# Patient Record
Sex: FEMALE | Race: WHITE | HISPANIC OR LATINO | Employment: UNEMPLOYED | URBAN - METROPOLITAN AREA
[De-identification: names, ages, dates, MRNs, and addresses within clinical notes are randomized per-mention and may not be internally consistent; named-entity substitution may affect disease eponyms.]

---

## 2017-08-23 ENCOUNTER — APPOINTMENT (EMERGENCY)
Dept: RADIOLOGY | Facility: HOSPITAL | Age: 27
End: 2017-08-23
Payer: MEDICARE

## 2017-08-23 ENCOUNTER — HOSPITAL ENCOUNTER (EMERGENCY)
Facility: HOSPITAL | Age: 27
Discharge: HOME | End: 2017-08-23
Attending: EMERGENCY MEDICINE | Admitting: EMERGENCY MEDICINE
Payer: MEDICARE

## 2017-08-23 VITALS
TEMPERATURE: 98.3 F | DIASTOLIC BLOOD PRESSURE: 52 MMHG | OXYGEN SATURATION: 100 % | RESPIRATION RATE: 16 BRPM | HEART RATE: 56 BPM | SYSTOLIC BLOOD PRESSURE: 98 MMHG | WEIGHT: 165 LBS

## 2017-08-23 DIAGNOSIS — N20.0 NEPHROLITHIASIS: Primary | ICD-10-CM

## 2017-08-23 LAB
BACTERIA UR QL AUTO: ABNORMAL /HPF
BILIRUB UR QL STRIP: NEGATIVE
CLARITY UR: ABNORMAL
CLARITY, POC: NORMAL
COLOR UR: YELLOW
COLOR, POC: YELLOW
GLUCOSE UR STRIP-MCNC: NEGATIVE MG/DL
HCG UR QL: NEGATIVE
HGB UR QL STRIP.AUTO: ABNORMAL
HYALINE CASTS #/AREA URNS LPF: ABNORMAL /LPF
KETONES UR STRIP-MCNC: ABNORMAL MG/DL
LEUKOCYTE ESTERASE UR QL STRIP: ABNORMAL
NITRITE UR QL STRIP: NEGATIVE
NON-SQ EPI CELLS URNS QL MICRO: ABNORMAL /HPF
PH UR STRIP.AUTO: 7 [PH] (ref 4.5–8)
PROT UR STRIP-MCNC: ABNORMAL MG/DL
RBC #/AREA URNS AUTO: ABNORMAL /HPF
SP GR UR STRIP.AUTO: 1.02 (ref 1–1.03)
UROBILINOGEN UR QL STRIP.AUTO: 1 E.U./DL
WBC #/AREA URNS AUTO: ABNORMAL /HPF

## 2017-08-23 PROCEDURE — 81001 URINALYSIS AUTO W/SCOPE: CPT

## 2017-08-23 PROCEDURE — 74176 CT ABD & PELVIS W/O CONTRAST: CPT

## 2017-08-23 PROCEDURE — 96375 TX/PRO/DX INJ NEW DRUG ADDON: CPT

## 2017-08-23 PROCEDURE — 81025 URINE PREGNANCY TEST: CPT | Performed by: EMERGENCY MEDICINE

## 2017-08-23 PROCEDURE — 81002 URINALYSIS NONAUTO W/O SCOPE: CPT | Performed by: EMERGENCY MEDICINE

## 2017-08-23 PROCEDURE — 99284 EMERGENCY DEPT VISIT MOD MDM: CPT

## 2017-08-23 PROCEDURE — 96374 THER/PROPH/DIAG INJ IV PUSH: CPT

## 2017-08-23 RX ORDER — OXYCODONE HYDROCHLORIDE AND ACETAMINOPHEN 5; 325 MG/1; MG/1
1 TABLET ORAL EVERY 4 HOURS PRN
Qty: 20 TABLET | Refills: 0 | Status: SHIPPED | OUTPATIENT
Start: 2017-08-23 | End: 2017-08-29

## 2017-08-23 RX ORDER — IBUPROFEN 400 MG/1
400 TABLET ORAL EVERY 6 HOURS PRN
Qty: 30 TABLET | Refills: 0 | Status: SHIPPED | OUTPATIENT
Start: 2017-08-23 | End: 2017-08-29

## 2017-08-23 RX ORDER — KETOROLAC TROMETHAMINE 30 MG/ML
15 INJECTION, SOLUTION INTRAMUSCULAR; INTRAVENOUS ONCE
Status: COMPLETED | OUTPATIENT
Start: 2017-08-23 | End: 2017-08-23

## 2017-08-23 RX ADMIN — HYDROMORPHONE HYDROCHLORIDE 1 MG: 1 INJECTION, SOLUTION INTRAMUSCULAR; INTRAVENOUS; SUBCUTANEOUS at 12:10

## 2017-08-23 RX ADMIN — KETOROLAC TROMETHAMINE 15 MG: 30 INJECTION, SOLUTION INTRAMUSCULAR at 12:12

## 2017-08-29 ENCOUNTER — APPOINTMENT (EMERGENCY)
Dept: RADIOLOGY | Facility: HOSPITAL | Age: 27
End: 2017-08-29
Payer: MEDICARE

## 2017-08-29 ENCOUNTER — HOSPITAL ENCOUNTER (EMERGENCY)
Facility: HOSPITAL | Age: 27
Discharge: HOME | End: 2017-08-29
Attending: EMERGENCY MEDICINE | Admitting: EMERGENCY MEDICINE
Payer: MEDICARE

## 2017-08-29 VITALS
TEMPERATURE: 98.4 F | WEIGHT: 165 LBS | BODY MASS INDEX: 30.36 KG/M2 | OXYGEN SATURATION: 97 % | SYSTOLIC BLOOD PRESSURE: 117 MMHG | DIASTOLIC BLOOD PRESSURE: 58 MMHG | HEART RATE: 83 BPM | RESPIRATION RATE: 16 BRPM | HEIGHT: 62 IN

## 2017-08-29 DIAGNOSIS — N20.0 RIGHT NEPHROLITHIASIS: Primary | ICD-10-CM

## 2017-08-29 LAB
ANION GAP BLD CALC-SCNC: 18 MMOL/L (ref 4–13)
BACTERIA UR QL AUTO: ABNORMAL /HPF
BILIRUB UR QL STRIP: NEGATIVE
BUN BLD-MCNC: 9 MG/DL (ref 5–25)
CA-I BLD-SCNC: 1.2 MMOL/L (ref 1.12–1.32)
CHLORIDE BLD-SCNC: 102 MMOL/L (ref 100–108)
CLARITY UR: CLEAR
COLOR UR: YELLOW
COLOR, POC: YELLOW
CREAT BLD-MCNC: 0.8 MG/DL (ref 0.6–1.3)
GFR SERPL CREATININE-BSD FRML MDRD: 101 ML/MIN/1.73SQ M
GLUCOSE SERPL-MCNC: 110 MG/DL (ref 65–140)
GLUCOSE UR STRIP-MCNC: NEGATIVE MG/DL
HCT VFR BLD CALC: 38 % (ref 34.8–46.1)
HGB BLDA-MCNC: 12.9 G/DL (ref 11.5–15.4)
HGB UR QL STRIP.AUTO: ABNORMAL
HYALINE CASTS #/AREA URNS LPF: ABNORMAL /LPF
KETONES UR STRIP-MCNC: NEGATIVE MG/DL
LEUKOCYTE ESTERASE UR QL STRIP: ABNORMAL
NITRITE UR QL STRIP: NEGATIVE
NON-SQ EPI CELLS URNS QL MICRO: ABNORMAL /HPF
PCO2 BLD: 29 MMOL/L (ref 21–32)
PH UR STRIP.AUTO: 7 [PH] (ref 4.5–8)
POTASSIUM BLD-SCNC: 3.8 MMOL/L (ref 3.5–5.3)
PROT UR STRIP-MCNC: NEGATIVE MG/DL
RBC #/AREA URNS AUTO: ABNORMAL /HPF
SODIUM BLD-SCNC: 143 MMOL/L (ref 136–145)
SP GR UR STRIP.AUTO: 1.01 (ref 1–1.03)
SPECIMEN SOURCE: ABNORMAL
UROBILINOGEN UR QL STRIP.AUTO: 1 E.U./DL
WBC #/AREA URNS AUTO: ABNORMAL /HPF

## 2017-08-29 PROCEDURE — 81025 URINE PREGNANCY TEST: CPT | Performed by: EMERGENCY MEDICINE

## 2017-08-29 PROCEDURE — 80047 BASIC METABLC PNL IONIZED CA: CPT

## 2017-08-29 PROCEDURE — 96375 TX/PRO/DX INJ NEW DRUG ADDON: CPT

## 2017-08-29 PROCEDURE — 96374 THER/PROPH/DIAG INJ IV PUSH: CPT

## 2017-08-29 PROCEDURE — 99284 EMERGENCY DEPT VISIT MOD MDM: CPT

## 2017-08-29 PROCEDURE — 81002 URINALYSIS NONAUTO W/O SCOPE: CPT | Performed by: EMERGENCY MEDICINE

## 2017-08-29 PROCEDURE — 85014 HEMATOCRIT: CPT

## 2017-08-29 PROCEDURE — 81001 URINALYSIS AUTO W/SCOPE: CPT

## 2017-08-29 PROCEDURE — 74176 CT ABD & PELVIS W/O CONTRAST: CPT

## 2017-08-29 RX ORDER — NAPROXEN 500 MG/1
500 TABLET ORAL 2 TIMES DAILY WITH MEALS
Qty: 30 TABLET | Refills: 0 | Status: SHIPPED | OUTPATIENT
Start: 2017-08-29 | End: 2018-02-07

## 2017-08-29 RX ORDER — TAMSULOSIN HYDROCHLORIDE 0.4 MG/1
0.4 CAPSULE ORAL
Qty: 7 CAPSULE | Refills: 0 | Status: SHIPPED | OUTPATIENT
Start: 2017-08-29 | End: 2018-02-07

## 2017-08-29 RX ORDER — KETOROLAC TROMETHAMINE 30 MG/ML
15 INJECTION, SOLUTION INTRAMUSCULAR; INTRAVENOUS ONCE
Status: COMPLETED | OUTPATIENT
Start: 2017-08-29 | End: 2017-08-29

## 2017-08-29 RX ORDER — OXYCODONE HYDROCHLORIDE AND ACETAMINOPHEN 5; 325 MG/1; MG/1
1 TABLET ORAL EVERY 4 HOURS PRN
Qty: 12 TABLET | Refills: 0 | Status: SHIPPED | OUTPATIENT
Start: 2017-08-29 | End: 2018-02-07

## 2017-08-29 RX ORDER — ONDANSETRON 2 MG/ML
4 INJECTION INTRAMUSCULAR; INTRAVENOUS ONCE
Status: COMPLETED | OUTPATIENT
Start: 2017-08-29 | End: 2017-08-29

## 2017-08-29 RX ORDER — ONDANSETRON 4 MG/1
4 TABLET, ORALLY DISINTEGRATING ORAL EVERY 8 HOURS PRN
Qty: 20 TABLET | Refills: 0 | Status: SHIPPED | OUTPATIENT
Start: 2017-08-29 | End: 2018-02-07

## 2017-08-29 RX ADMIN — HYDROMORPHONE HYDROCHLORIDE 0.5 MG: 1 INJECTION, SOLUTION INTRAMUSCULAR; INTRAVENOUS; SUBCUTANEOUS at 21:09

## 2017-08-29 RX ADMIN — KETOROLAC TROMETHAMINE 15 MG: 30 INJECTION, SOLUTION INTRAMUSCULAR at 21:09

## 2017-08-29 RX ADMIN — ONDANSETRON 4 MG: 2 INJECTION INTRAMUSCULAR; INTRAVENOUS at 21:09

## 2017-08-29 RX ADMIN — SODIUM CHLORIDE 1000 ML: 0.9 INJECTION, SOLUTION INTRAVENOUS at 21:20

## 2017-09-13 ENCOUNTER — GENERIC CONVERSION - ENCOUNTER (OUTPATIENT)
Dept: OTHER | Facility: OTHER | Age: 27
End: 2017-09-13

## 2018-01-10 ENCOUNTER — ALLSCRIPTS OFFICE VISIT (OUTPATIENT)
Dept: OTHER | Facility: OTHER | Age: 28
End: 2018-01-10

## 2018-01-10 DIAGNOSIS — E04.1 NONTOXIC SINGLE THYROID NODULE: ICD-10-CM

## 2018-01-10 DIAGNOSIS — R73.01 IMPAIRED FASTING GLUCOSE: ICD-10-CM

## 2018-01-10 DIAGNOSIS — E28.2 POLYCYSTIC OVARIAN SYNDROME: ICD-10-CM

## 2018-01-10 DIAGNOSIS — E03.9 HYPOTHYROIDISM: ICD-10-CM

## 2018-01-10 DIAGNOSIS — E04.9 NONTOXIC GOITER: ICD-10-CM

## 2018-01-12 NOTE — PROGRESS NOTES
Assessment   1  Hypothyroidism (244 9) (E03 9)   2  Thyroid nodule (241 0) (E04 1)   3  Enlarged thyroid (240 9) (E04 9)   4  Asthma (493 90) (J45 909)   5  Polycystic ovarian syndrome (256 4) (E28 2)   6  Impaired fasting blood sugar (790 21) (R73 01)    Plan   Asthma    · Proventil  (90 Base) MCG/ACT Inhalation Aerosol Solution; INHALE 1 TO 2    PUFFS EVERY 4 TO 6 HOURS AS NEEDED  Enlarged thyroid, Thyroid nodule    · US THYROID; Status:Hold For - Scheduling; Requested for:10Jan2018;   Hypothyroidism    · (1) CBC/PLT/DIFF; Status:Active; Requested UOH:[id number];    · (1) COMPREHENSIVE METABOLIC PANEL; Status:Active; Requested QVC:[id number];    · (1) LIPID PANEL, FASTING; Status:Active; Requested INR:[id number];    · (1) TSH; Status:Active; Requested TTC:[id number]; Impaired fasting blood sugar, Polycystic ovarian syndrome    · (1) HEMOGLOBIN A1C; Status:Active; Requested CATALINA:[id number]; Discussion/Summary      29 yo female hypothyroid/thyroid nodules- see above A/P- will restart levothyroxine after lab results reviewed  Asthma- by history mild, intermittent  Renewed rescue inhaler  Follow up in next 4-6 weeks-  future needs- spirometry and ACT, depression screen, GYN/PAP flu vaccine  The patient was counseled regarding instructions for management,-- impressions,-- importance of compliance with treatment  total time of encounter was 40 minutes-- and-- 20 minutes was spent counseling  Possible side effects of new medications were reviewed with the patient/guardian today  The treatment plan was reviewed with the patient/guardian  The patient/guardian understands and agrees with the treatment plan      Chief Complaint   thyroid check    Patient is here today for follow up of chronic conditions described in HPI  History of Present Illness   29 yo female here to re-establish care  Relocated to [place], moved back last year and hasnât re-established care   She's been off all medications for about 1 year  fatigued, chronically  +hypothyroid and thyroid nodules- didn't follow up with US thyroid, off medication for about 1 year  PCOS and having painful periods and pelvic pain intermittently  Was metformin in past  LMP- 1/1/2018, regular  Last PAP uncertain  using rescue inhaler rarely, only with URI symptoms  Hasn't had to use in past 4 weeks at all  Review of Systems        Constitutional: no fever,-- not feeling poorly-- and-- not feeling tired  Eyes: no eyesight problems  ENT: no hearing loss  Cardiovascular: no chest pain,-- no intermittent leg claudication,-- no palpitations-- and-- no lower extremity edema  Respiratory: no shortness of breath,-- no cough-- and-- no wheezing  Gastrointestinal: no abdominal pain,-- no nausea-- and-- no diarrhea  Genitourinary: as noted in HPI  Neurological: no headache,-- no numbness,-- no tingling-- and-- no fainting  Psychiatric: not suicidal,-- no anxiety-- and-- no depression  Endocrine: no muscle weakness  Hematologic/Lymphatic: no tendency for easy bleeding-- and-- no tendency for easy bruising  Active Problems   1  Asthma (493 90) (J45 909)   2  Depression (311) (F32 9)   3  Enlarged thyroid (240 9) (E04 9)   4  Hypothyroidism (244 9) (E03 9)   5  Polycystic ovarian syndrome (256 4) (E28 2)   6  Primary female infertility (628 9) (N97 9)   7  Thyroid nodule (241 0) (E04 1)   8  Tinea corporis (110 5) (B35 4)    Past Medical History      The active problems and past medical history were reviewed and updated today  Family History   Mother    1  No pertinent family history  Family History    2  Family history of Diabetes Mellitus (V18 0)   3  Family history of Hypertension (V17 49)     The family history was reviewed and updated today         Social History    · Being A Social Drinker   · Denied: History of Drug use   · Former smoker (V15 82) (I94 250)   · History of Using Marijuana  The social history was reviewed and updated today  Current Meds      The medication list was reviewed and updated today  Allergies   1  No Known Drug Allergies  2  No Known Environmental Allergies   3  No Known Food Allergies    Vitals   Vital Signs    Recorded: [id number] 11:50AM   Temperature 97 5 F   Heart Rate 78   Respiration 16   Systolic 760   Diastolic 80   Height 5 ft 2 in   Weight 147 lb 2 oz   BMI Calculated 26 91   BSA Calculated 1 68     Physical Exam        Constitutional      General appearance: No acute distress, well appearing and well nourished  Eyes      Conjunctiva and lids: No swelling, erythema or discharge  Pupils and irises: Equal, round and reactive to light  Ears, Nose, Mouth, and Throat      External inspection of ears and nose: Normal        Otoscopic examination: Tympanic membranes translucent with normal light reflex  Canals patent without erythema  Nasal mucosa, septum, and turbinates: Normal without edema or erythema  Oropharynx: Normal with no erythema, edema, exudate or lesions  Pulmonary      Respiratory effort: No increased work of breathing or signs of respiratory distress  Auscultation of lungs: Clear to auscultation  Cardiovascular      Auscultation of heart: Normal rate and rhythm, normal S1 and S2, without murmurs  Examination of extremities for edema and/or varicosities: Normal        Abdomen      Abdomen: Non-tender, no masses  Lymphatic      Palpation of lymph nodes in neck: No lymphadenopathy  Musculoskeletal      Gait and station: Normal        Digits and nails: Normal without clubbing or cyanosis  Skin      Skin and subcutaneous tissue: Normal without rashes or lesions  Neurologic      Reflexes: 2+ and symmetric         Psychiatric      Orientation to person, place, and time: Normal        Mood and affect: Normal        Additional Exam:  thyroid, full/mildly enlarged, ?nodule left lobe  Attending Note   Collaborating Note: I agree with the Advanced Practitioner note        Signatures    Electronically signed by : Sonja Arango, [address]; Francisco 10 2018  1:17PM EST                       (Author)     Electronically signed by : AUREA Bennett ; Francisco 10 2018  1:58PM EST                       (Author)

## 2018-01-16 ENCOUNTER — APPOINTMENT (OUTPATIENT)
Dept: LAB | Facility: HOSPITAL | Age: 28
End: 2018-01-16
Payer: MEDICARE

## 2018-01-16 ENCOUNTER — HOSPITAL ENCOUNTER (OUTPATIENT)
Dept: RADIOLOGY | Facility: HOSPITAL | Age: 28
Discharge: HOME | End: 2018-01-16
Payer: MEDICARE

## 2018-01-16 ENCOUNTER — TRANSCRIBE ORDERS (OUTPATIENT)
Dept: LAB | Facility: HOSPITAL | Age: 28
End: 2018-01-16

## 2018-01-16 ENCOUNTER — GENERIC CONVERSION - ENCOUNTER (OUTPATIENT)
Dept: OTHER | Facility: OTHER | Age: 28
End: 2018-01-16

## 2018-01-16 DIAGNOSIS — E28.2 POLYCYSTIC OVARIAN SYNDROME: ICD-10-CM

## 2018-01-16 DIAGNOSIS — E04.1 NONTOXIC SINGLE THYROID NODULE: ICD-10-CM

## 2018-01-16 DIAGNOSIS — R73.01 IMPAIRED FASTING GLUCOSE: ICD-10-CM

## 2018-01-16 DIAGNOSIS — E03.9 HYPOTHYROIDISM: ICD-10-CM

## 2018-01-16 DIAGNOSIS — E04.9 NONTOXIC GOITER: ICD-10-CM

## 2018-01-16 LAB
ALBUMIN SERPL BCP-MCNC: 4.1 G/DL (ref 3.5–5)
ALP SERPL-CCNC: 59 U/L (ref 46–116)
ALT SERPL W P-5'-P-CCNC: 21 U/L (ref 12–78)
ANION GAP SERPL CALCULATED.3IONS-SCNC: 6 MMOL/L (ref 4–13)
AST SERPL W P-5'-P-CCNC: 17 U/L (ref 5–45)
BASOPHILS # BLD AUTO: 0.01 THOUSANDS/ΜL (ref 0–0.1)
BASOPHILS NFR BLD AUTO: 0 % (ref 0–1)
BILIRUB SERPL-MCNC: 0.61 MG/DL (ref 0.2–1)
BUN SERPL-MCNC: 12 MG/DL (ref 5–25)
CALCIUM SERPL-MCNC: 9.4 MG/DL (ref 8.3–10.1)
CHLORIDE SERPL-SCNC: 103 MMOL/L (ref 100–108)
CHOLEST SERPL-MCNC: 173 MG/DL (ref 50–200)
CO2 SERPL-SCNC: 30 MMOL/L (ref 21–32)
CREAT SERPL-MCNC: 0.68 MG/DL (ref 0.6–1.3)
EOSINOPHIL # BLD AUTO: 0.14 THOUSAND/ΜL (ref 0–0.61)
EOSINOPHIL NFR BLD AUTO: 3 % (ref 0–6)
ERYTHROCYTE [DISTWIDTH] IN BLOOD BY AUTOMATED COUNT: 12.1 % (ref 11.6–15.1)
EST. AVERAGE GLUCOSE BLD GHB EST-MCNC: 108 MG/DL
GFR SERPL CREATININE-BSD FRML MDRD: 119 ML/MIN/1.73SQ M
GLUCOSE P FAST SERPL-MCNC: 94 MG/DL (ref 65–99)
HBA1C MFR BLD: 5.4 % (ref 4.2–6.3)
HCT VFR BLD AUTO: 41.5 % (ref 34.8–46.1)
HDLC SERPL-MCNC: 73 MG/DL (ref 40–60)
HGB BLD-MCNC: 14.3 G/DL (ref 11.5–15.4)
LDLC SERPL CALC-MCNC: 86 MG/DL (ref 0–100)
LYMPHOCYTES # BLD AUTO: 2.01 THOUSANDS/ΜL (ref 0.6–4.47)
LYMPHOCYTES NFR BLD AUTO: 45 % (ref 14–44)
MCH RBC QN AUTO: 32.9 PG (ref 26.8–34.3)
MCHC RBC AUTO-ENTMCNC: 34.5 G/DL (ref 31.4–37.4)
MCV RBC AUTO: 95 FL (ref 82–98)
MONOCYTES # BLD AUTO: 0.3 THOUSAND/ΜL (ref 0.17–1.22)
MONOCYTES NFR BLD AUTO: 7 % (ref 4–12)
NEUTROPHILS # BLD AUTO: 2.04 THOUSANDS/ΜL (ref 1.85–7.62)
NEUTS SEG NFR BLD AUTO: 45 % (ref 43–75)
NRBC BLD AUTO-RTO: 0 /100 WBCS
PLATELET # BLD AUTO: 322 THOUSANDS/UL (ref 149–390)
PMV BLD AUTO: 9.8 FL (ref 8.9–12.7)
POTASSIUM SERPL-SCNC: 4.1 MMOL/L (ref 3.5–5.3)
PROT SERPL-MCNC: 8 G/DL (ref 6.4–8.2)
RBC # BLD AUTO: 4.35 MILLION/UL (ref 3.81–5.12)
SODIUM SERPL-SCNC: 139 MMOL/L (ref 136–145)
TRIGL SERPL-MCNC: 69 MG/DL
TSH SERPL DL<=0.05 MIU/L-ACNC: 8.74 UIU/ML (ref 0.36–3.74)
WBC # BLD AUTO: 4.51 THOUSAND/UL (ref 4.31–10.16)

## 2018-01-16 PROCEDURE — 80061 LIPID PANEL: CPT

## 2018-01-16 PROCEDURE — 76536 US EXAM OF HEAD AND NECK: CPT

## 2018-01-16 PROCEDURE — 84443 ASSAY THYROID STIM HORMONE: CPT

## 2018-01-16 PROCEDURE — 36415 COLL VENOUS BLD VENIPUNCTURE: CPT

## 2018-01-16 PROCEDURE — 85025 COMPLETE CBC W/AUTO DIFF WBC: CPT

## 2018-01-16 PROCEDURE — 80053 COMPREHEN METABOLIC PANEL: CPT

## 2018-01-16 PROCEDURE — 83036 HEMOGLOBIN GLYCOSYLATED A1C: CPT

## 2018-01-23 ENCOUNTER — GENERIC CONVERSION - ENCOUNTER (OUTPATIENT)
Dept: OTHER | Facility: OTHER | Age: 28
End: 2018-01-23

## 2018-01-23 VITALS
RESPIRATION RATE: 16 BRPM | BODY MASS INDEX: 27.08 KG/M2 | HEIGHT: 62 IN | DIASTOLIC BLOOD PRESSURE: 80 MMHG | WEIGHT: 147.13 LBS | HEART RATE: 78 BPM | SYSTOLIC BLOOD PRESSURE: 116 MMHG | TEMPERATURE: 97.5 F

## 2018-01-23 NOTE — RESULT NOTES
Discussion/Summary     labs show hypothyroidism  All other results were in normal range  Plan- levothyroxine ordered  Recheck TSH in 8 weeks  Follow up  in office as planned  Verified Results  (1) TSH [id number] 12:09PM Rea Cortes Order Number: [id number]     Test Name Result Flag Reference   TSH 8 740 uIU/mL H 0 358-3 740   Patients undergoing fluorescein dye angiography may retain small amounts of fluorescein in the body for 48-72 hours post procedure  Samples containing fluorescein can produce falsely depressed TSH values  If the patient had this procedure,a specimen should be resubmitted post fluorescein clearance  The recommended reference ranges for TSH during pregnancy are as follows:  First trimester 0 1 to 2 5 uIU/mL  Second trimester  0 2 to 3 0 uIU/mL  Third trimester 0 3 to 3 0 uIU/m     (1) CBC/PLT/DIFF [id number] 12:09PM Rea Cortes Order Number: [id number]     Test Name Result Flag Reference   WBC COUNT 4 51 Thousand/uL  4 31-10 16   RBC COUNT 4 35 Million/uL  3 81-5 12   HEMOGLOBIN 14 3 g/dL  11 5-15 4   HEMATOCRIT 41 5 %  34 8-46  1   MCV 95 fL  82-98   MCH 32 9 pg  26 8-34 3   MCHC 34 5 g/dL  31 4-37 4   RDW 12 1 %  11 6-15 1   MPV 9 8 fL  8 9-12 7   PLATELET COUNT 253 Thousands/uL  149-390   nRBC AUTOMATED 0 /100 WBCs     NEUTROPHILS RELATIVE PERCENT 45 %  43-75   LYMPHOCYTES RELATIVE PERCENT 45 % H 14-44   MONOCYTES RELATIVE PERCENT 7 %  4-12   EOSINOPHILS RELATIVE PERCENT 3 %  0-6   BASOPHILS RELATIVE PERCENT 0 %  0-1   NEUTROPHILS ABSOLUTE COUNT 2 04 Thousands/? ??L  1 85-7 62   LYMPHOCYTES ABSOLUTE COUNT 2 01 Thousands/? ??L  0 60-4 47   MONOCYTES ABSOLUTE COUNT 0 30 Thousand/? ??L  0 17-1 22   EOSINOPHILS ABSOLUTE COUNT 0 14 Thousand/? ??L  0 00-0 61   BASOPHILS ABSOLUTE COUNT 0 01 Thousands/? ??L  0 00-0 10     (1) COMPREHENSIVE METABOLIC PANEL [id number] 58:52TA Rea Cortes Order Number: [id number]     Test Name Result Flag Reference   SODIUM 139 mmol/L  136-145   POTASSIUM 4 1 mmol/L  3 5-5 3   CHLORIDE 103 mmol/L  100-108   CARBON DIOXIDE 30 mmol/L  21-32   ANION GAP (CALC) 6 mmol/L  4-13   BLOOD UREA NITROGEN 12 mg/dL  5-25   CREATININE 0 68 mg/dL  0 60-1 30   Standardized to IDMS reference method   CALCIUM 9 4 mg/dL  8 3-10 1   BILI, TOTAL 0 61 mg/dL  0 20-1 00   ALK PHOSPHATAS 59 U/L     ALT (SGPT) 21 U/L  12-78   Specimen collection should occur prior to Sulfasalazine and/or Sulfapyridine administration due to the potential for falsely depressed results  AST(SGOT) 17 U/L  5-45   Specimen collection should occur prior to Sulfasalazine administration due to the potential for falsely depressed results  ALBUMIN 4 1 g/dL  3 5-5 0   TOTAL PROTEIN 8 0 g/dL  6 4-8 2   eGFR 119 ml/min/1 73sq m     [facility] Disease Education Program recommendations are as follows:  GFR calculation is accurate only with a steady state creatinine  Chronic Kidney disease less than 60 ml/min/1 73 sq  meters  Kidney failure less than 15 ml/min/1 73 sq  meters  GLUCOSE FASTING 94 mg/dL  65-99   Specimen collection should occur prior to Sulfasalazine administration due to the potential for falsely depressed results  Specimen collection should occur prior to Sulfapyridine administration due to the potential for falsely elevated results  (1) LIPID PANEL, FASTING [id number] 12:09PM Cha Knapp    Order Number: [id number]     Test Name Result Flag Reference   CHOLESTEROL 173 mg/dL     Cholesterol:    Desirable <200 mg/dl    Borderline 200-239 mg/dl    High>239   HDL,DIRECT 73 mg/dL H 40-60   HDL Cholesterol:    High>59 mg/dL    Low <41 mg/dL   LDL CHOLESTEROL CALCULATED 86 mg/dL  0-100   This screening LDL is a calculated result  It does not have the accuracy of the Direct Measured LDL in the monitoring of patients with hyperlipidemia and/or statin therapy     Direct Measure LDL (WSO040) must be ordered separately in these patients  Triglyceride:        Normal <150 mg/dl   Borderline High 150-199 mg/dl   High 200-499 mg/dl   Very High >499 mg/dl   TRIGLYCERIDES 69 mg/dL  <=150   Specimen collection should occur prior to N-Acetylcysteine or Metamizole administration due to the potential for falsely depressed results  (1) HEMOGLOBIN A1C [id number] 12:09PM Leonardo Min Order Number: [id number]     Test Name Result Flag Reference   HEMOGLOBIN A1C 5 4 %  4 2-6 3   EST  AVG  GLUCOSE 108 mg/dl         Plan  Hypothyroidism    · Levothyroxine Sodium 25 MCG Oral Tablet; TAKE 1 TABLET DAILY AS DIRECTED  on an empty stomach   · (1) TSH; Status:Active;  Requested NNT:[id number];     Signatures   Electronically signed by : Freddie Mulligan, [facility]; Jan 16 2018  3:57PM EST                       (Author)

## 2018-01-24 ENCOUNTER — TELEPHONE (OUTPATIENT)
Dept: FAMILY MEDICINE CLINIC | Facility: CLINIC | Age: 28
End: 2018-01-24

## 2018-01-24 NOTE — RESULT NOTES
Discussion/Summary   No discrete noduled identified on thyroid us  Consider futher eval  Schedule office visit for re-eval and exam      Verified Results  [address] [place] [address] [id number] 11:26AM Madeline Ramos Order Number: [id number]    - Patient Instructions: To schedule this appointment, please contact Central Scheduling at [phone]  Test Name Result Flag Reference   US THYROID (Report)     THYROID ULTRASOUND     INDICATION: E04 1: Nontoxic single thyroid nodule   E04 9: Nontoxic goiter, unspecified  History taken directly from the electronic ordering system  COMPARISON: October 20, 2011     TECHNIQUE:  Ultrasound of the thyroid was performed with a high frequency linear transducer in transverse and sagittal planes including volumetric imaging sweeps as well as traditional still imaging technique  FINDINGS: Thyroid parenchyma is diffusely heterogeneous in echotexture  Right lobe: 4 5 x 1 8 x 1 7 cm  Left lobe: 4 7 x 1 7 x 1 7 cm  Isthmus: 0 4 cm  No nodules bilaterally of greater than 1cm  IMPRESSION:   Extremely heterogeneous thyroid gland which can obscure thyroid nodules  No discrete nodules were identified  Reference: ACR Thyroid Imaging, Reporting and Data System (TI-RADS): White Paper of the [facility]  J AM Kurtis Radiol 5147;25:[id number]          Workstation performed: [id number]     Signed by:   Charbel Souza MD   1/16/18

## 2018-02-05 ENCOUNTER — HOSPITAL ENCOUNTER (EMERGENCY)
Facility: HOSPITAL | Age: 28
Discharge: HOME | End: 2018-02-05
Attending: EMERGENCY MEDICINE | Admitting: EMERGENCY MEDICINE
Payer: COMMERCIAL

## 2018-02-05 VITALS
OXYGEN SATURATION: 96 % | WEIGHT: 158 LBS | TEMPERATURE: 97.7 F | HEIGHT: 62 IN | SYSTOLIC BLOOD PRESSURE: 103 MMHG | BODY MASS INDEX: 29.08 KG/M2 | RESPIRATION RATE: 18 BRPM | DIASTOLIC BLOOD PRESSURE: 57 MMHG | HEART RATE: 89 BPM

## 2018-02-05 DIAGNOSIS — F10.10 ALCOHOL ABUSE: ICD-10-CM

## 2018-02-05 DIAGNOSIS — F32.A DEPRESSION: Primary | ICD-10-CM

## 2018-02-05 LAB
AMPHETAMINES SERPL QL SCN: NEGATIVE
BARBITURATES UR QL: NEGATIVE
BENZODIAZ UR QL: NEGATIVE
COCAINE UR QL: NEGATIVE
ETHANOL EXG-MCNC: 0.19 MG/DL
METHADONE UR QL: NEGATIVE
OPIATES UR QL SCN: NEGATIVE
PCP UR QL: NEGATIVE
THC UR QL: NEGATIVE

## 2018-02-05 PROCEDURE — 99284 EMERGENCY DEPT VISIT MOD MDM: CPT

## 2018-02-05 PROCEDURE — 82075 ASSAY OF BREATH ETHANOL: CPT | Performed by: EMERGENCY MEDICINE

## 2018-02-05 PROCEDURE — 90715 TDAP VACCINE 7 YRS/> IM: CPT | Performed by: EMERGENCY MEDICINE

## 2018-02-05 PROCEDURE — 80307 DRUG TEST PRSMV CHEM ANLYZR: CPT | Performed by: EMERGENCY MEDICINE

## 2018-02-05 PROCEDURE — 90471 IMMUNIZATION ADMIN: CPT

## 2018-02-05 RX ORDER — IBUPROFEN 400 MG/1
400 TABLET ORAL ONCE
Status: COMPLETED | OUTPATIENT
Start: 2018-02-05 | End: 2018-02-05

## 2018-02-05 RX ORDER — LIDOCAINE HYDROCHLORIDE AND EPINEPHRINE 10; 10 MG/ML; UG/ML
5 INJECTION, SOLUTION INFILTRATION; PERINEURAL ONCE
Status: COMPLETED | OUTPATIENT
Start: 2018-02-05 | End: 2018-02-05

## 2018-02-05 RX ADMIN — IBUPROFEN 400 MG: 400 TABLET, FILM COATED ORAL at 08:57

## 2018-02-05 RX ADMIN — TETANUS TOXOID, REDUCED DIPHTHERIA TOXOID AND ACELLULAR PERTUSSIS VACCINE, ADSORBED 0.5 ML: 5; 2.5; 8; 8; 2.5 SUSPENSION INTRAMUSCULAR at 05:34

## 2018-02-05 RX ADMIN — LIDOCAINE HYDROCHLORIDE,EPINEPHRINE BITARTRATE 5 ML: 10; .01 INJECTION, SOLUTION INFILTRATION; PERINEURAL at 04:25

## 2018-02-05 NOTE — DISCHARGE INSTRUCTIONS
Please seek immediate medical attention if you again developed thoughts of hurting yourself  Please use the outpatient follow-up provided for you by our crisis worker  Please refrain from drinking alcohol or using drugs  Return to the ER for new or worrisome symptoms  Depression   WHAT YOU NEED TO KNOW:   Depression is a medical condition that causes feelings of sadness or hopelessness that do not go away  Depression may cause you to lose interest in things you used to enjoy  These feelings may interfere with your daily life  DISCHARGE INSTRUCTIONS:   Call 911 for any of the following:   · You think about harming yourself or someone else  Contact your healthcare provider if:   · Your symptoms do not improve  · You cannot make it to your next appointment  · You have new symptoms  · You have questions or concerns about your condition or care  Medicines:   · Antidepressants  may be given to improve or balance your mood  You may need to take this medicine for several weeks before you begin to feel better  · Take your medicine as directed  Contact your healthcare provider if you think your medicine is not helping or if you have side effects  Tell him of her if you are allergic to any medicine  Keep a list of the medicines, vitamins, and herbs you take  Include the amounts, and when and why you take them  Bring the list or the pill bottles to follow-up visits  Carry your medicine list with you in case of an emergency  Therapy  may be used to treat your depression  A therapist will help you learn to cope with your thoughts and feelings  This can be done alone or in a group  It may also be done with family members or a significant other  Self-care:   · Get regular physical activity  Try to exercise for 30 minutes, 3 to 5 days a week  Work with your healthcare provider to develop an exercise plan that you enjoy  Physical activity may improve your symptoms  · Get enough sleep    Create a routine to help you relax before bed  You can listen to music, read, or do yoga  Try to go to bed and wake up at the same time every day  Sleep is important for emotional health  · Eat a variety of healthy foods from all of the food groups  A healthy meal plan is low in fat, salt, and added sugar  Ask your healthcare provider for more information about a meal plan that is right for you  · Do not drink alcohol or use drugs  Alcohol and drugs can make your symptoms worse  Follow up with your healthcare provider as directed: Your healthcare provider will monitor your progress at follow-up visits  He or she will also monitor your medicine if you take antidepressants  Your healthcare provider will ask if the medicine is helping  Tell him or her about any side effects or problems you may have with your medicine  The type or amount of medicine may need to be changed  Write down your questions so you remember to ask them during your visits  © 2017 [employer] Information is for End User's use only and may not be sold, redistributed or otherwise used for commercial purposes  All illustrations and images included in CareNotes® are the copyrighted property of [employer]  or [employer]  The above information is an  only  It is not intended as medical advice for individual conditions or treatments  Talk to your doctor, nurse or pharmacist before following any medical regimen to see if it is safe and effective for you  Abuse of Alcohol   AMBULATORY CARE:   Alcohol abuse   · is unhealthy drinking behavior  You may drink too much at one time once a week, or continue to drink too much daily  You continue to drink even though it causes problems  The problems can be alcohol related legal problems or problems with work or family  · If you drink too much at one time, you are binge drinking  Binge drinking is when you have a large amount of alcohol in a short time   Your blood alcohol concentrations (DANE) goes above 0 08 g/dLlevel during binge drinking  For men, this usually happens with more than 4 drinks in 2 hours  For women, it is more than 3 drinks in 2 hours  A drink is 12 ounces of beer, 4 ounces of wine, or 1½ ounces of liquor  Common signs and symptoms of alcohol abuse include:  Each person that abuses alcohol may have different symptoms  The following are common signs and symptoms of alcohol abuse:  · Loss of interest in activities, work, and school    · Decreased interest in family and friends    · Depression    · Constant thoughts about drinking    · Not able to control the amount you drink    · Restlessness, or erratic and violent behavior  Call 911 for any of the following:   · You have sudden chest pain or trouble breathing  · You have a seizure or have shaking or trembling  · You feel like you could harm yourself or others  · You were in an accident because of alcohol  Seek care immediately if:   · You have hallucinations (you see or hear things that are not real)  · You cannot stop vomiting or you vomit blood  Contact your healthcare provider if:   · You need help to stop drinking alcohol  · You have questions or concerns about your condition or care  Long-term effects of alcohol abuse:   · Blackouts    · Memory loss    · Dementia    · Liver disease    · Thiamine (vitamin B1) deficiency  Treatment for alcohol abuse  may include the following:  · Detoxification (detox) and withdrawal  is a program that helps you to safely get alcohol out of your body  Detox can also help get rid of the physical need to drink  Healthcare providers monitor the physical symptoms of withdrawal  They may give you medicines to help decrease nausea, dehydration, and seizures  Healthcare providers will also monitor your blood pressure, heart and breathing rates, and your temperature   Symptoms of anxiety, depression, and suicidal thoughts are also monitored and managed during detox  Healthcare providers may give you medicines for these symptoms and therapy sessions will be available to you  Detox is usually done at a detox center or in a hospital  Healthcare providers do not recommend that you try to detox at home or by yourself  Withdrawal symptoms may become life threatening  The center can help you find 12 step programs or an individual therapist to help with emotional support after detox  · Inpatient and outpatient treatment  focus on your personal needs to help you stop drinking  Treatment helps you understand the reasons you abuse alcohol  Counselors and therapists provide you with support and help you find ways to cope instead of drinking  You may need inpatient treatment to provide a controlled environment  You may need outpatient treatment after your inpatient treatment is complete  · Alcohol aversion therapy  takes away the desire to drink by causing a negative reaction when you drink  Healthcare providers may give you medicines that cause nausea and vomiting when you drink alcohol  They may instead give you a medicine that decreases your urge to drink alcohol  These medicines are used to help you stop drinking or reduce the amount you drink  They can also help you avoid relapse  Risks of alcohol abuse:  Alcohol abuse increases your risk for gastrointestinal cancers, brain damage and problems with your immune system  It also increases your risk for heart, kidney, and lung damage  The risk of stroke increases with alcohol abuse  If you are pregnant and drink alcohol, you and your baby are at risk for serious health problems  Avoid alcohol:  You should stop drinking entirely  Alcohol can damage your brain, heart, and liver  It also increases your risk for injury, high blood pressure, and certain types of cancer  Alcohol is dangerous when you combine it with certain medicines     Do not drive if you drink alcohol:  Make sure someone who has not been drinking can help you get home  Get support:  Most people need support to stop drinking alcohol  Mental health providers, support groups, rehabilitation centers, and your healthcare provider can provide support  For more information:   · Alcoholics Anonymous  Web Address: http://www [employer]/  · Substance Abuse and [place] [address] , [address]  Web Address: https://[employer]/  Follow up with your healthcare provider as directed:  Write down your questions so you remember to ask them during your visits  © 2017 [employer]  Information is for End User's use only and may not be sold, redistributed or otherwise used for commercial purposes  All illustrations and images included in CareNotes® are the copyrighted property of [employer]  or [employer]  The above information is an  only  It is not intended as medical advice for individual conditions or treatments  Talk to your doctor, nurse or pharmacist before following any medical regimen to see if it is safe and effective for you

## 2018-02-05 NOTE — ED NOTES
Was in talking with pt and now deciding if she should send pt home  pt is now resting  she did have her lunch     Karin Baumgarten  02/05/18 2298

## 2018-02-05 NOTE — ED NOTES
Pt ambulated to bathroom to provide urine specimen with friend and 1:1  Pt returned to room bleeding from laceration on wrist, friend claiming she didn't do anything  Pt urine specimen was very clear  Dr Nirmal Morris at bedside        Brandy Fields  02/05/18 [id number]

## 2018-02-05 NOTE — ED NOTES
2 pt belonging bags placed in zone 5 med room cabinet D        Zoë Jackter  02/05/18 [facility]  02/05/18 5137

## 2018-02-05 NOTE — ED NOTES
Patient states that she does not want to see a crisis worker and would like to leave  Dr Cecy Ralph made aware  Dr Cecy Ralph at bedside to speak with patient       Kyra Loya RN  02/05/18 0705

## 2018-02-05 NOTE — ED PROVIDER NOTES
History  Chief Complaint   Patient presents with    Psychiatric Evaluation     as per ems - patient having trouble at home, cut wrist this evening "because she tired of what she's going through"     79-year-old female presenting to the ER today with a chief complaint of suicide attempt  Patient states she has been depressed because of her aggressive roommate  States "he is doing things he shouldn't be"  Patient states she took a razor blade to her left wrist today  States she was trying to kill herself  Also states she drank several alcoholic beverages tonight  States she wants to hurt herself in the ER  On exam patient did have a 3 cm laceration noted on the palmar aspect of the left wrist   Patient went to the bathroom and attempted to reopen the laceration with her hand  Assessment plan:  79-year-old female with suicidality status post self-inflicted laceration  Will repair laceration, continue observation, psychiatric consult  Prior to Admission Medications   Prescriptions Last Dose Informant Patient Reported? Taking? Thyroid (LEVOTHYROXINE-LIOTHYRONINE PO)   Yes Yes   Sig: Take by mouth   naproxen (NAPROSYN) 500 mg tablet   No No   Sig: Take 1 tablet by mouth 2 (two) times a day with meals for 30 doses   ondansetron (ZOFRAN-ODT) 4 mg disintegrating tablet   No No   Sig: Take 1 tablet by mouth every 8 (eight) hours as needed for nausea or vomiting for up to 20 doses   oxyCODONE-acetaminophen (PERCOCET) 5-325 mg per tablet   No No   Sig: Take 1 tablet by mouth every 4 (four) hours as needed for moderate pain for up to 12 doses Max Daily Amount: 6 tablets   tamsulosin (FLOMAX) 0 4 mg   No No   Sig: Take 1 capsule by mouth daily with dinner for 7 doses      Facility-Administered Medications: None       Past Medical History:   Diagnosis Date    Asthma     Disease of thyroid gland     Kidney stones        History reviewed  No pertinent surgical history  History reviewed   No pertinent family history  I have reviewed and agree with the history as documented  Social History   Substance Use Topics    Smoking status: Never Smoker    Smokeless tobacco: Never Used    Alcohol use Yes        Review of Systems   Constitutional: Negative  Negative for appetite change, chills, diaphoresis, fatigue and fever  HENT: Negative  Eyes: Negative  Respiratory: Negative  Cardiovascular: Negative  Gastrointestinal: Negative for abdominal pain, blood in stool, constipation, diarrhea, nausea and vomiting  Endocrine: Negative  Genitourinary: Negative for decreased urine volume, difficulty urinating, dyspareunia, dysuria, flank pain, frequency, hematuria, pelvic pain, urgency, vaginal bleeding, vaginal discharge and vaginal pain  Musculoskeletal: Negative  Skin: Negative  Allergic/Immunologic: Negative  Neurological: Negative  Psychiatric/Behavioral: Positive for sleep disturbance and suicidal ideas  All other systems reviewed and are negative  Physical Exam  ED Triage Vitals   Temperature Pulse Respirations Blood Pressure SpO2   02/05/18 0413 02/05/18 0413 02/05/18 0413 02/05/18 0413 02/05/18 0413   97 7 °F (36 5 °C) 96 20 119/74 96 %      Temp Source Heart Rate Source Patient Position - Orthostatic VS BP Location FiO2 (%)   02/05/18 0413 02/05/18 0413 02/05/18 0905 -- --   Oral Monitor Lying        Pain Score       02/05/18 0413       2           Orthostatic Vital Signs  Vitals:    02/05/18 0413 02/05/18 0905   BP: 119/74 103/57   Pulse: 96 89   Patient Position - Orthostatic VS:  Lying       Physical Exam   Constitutional: She is oriented to person, place, and time  She appears well-developed and well-nourished  HENT:   Head: Normocephalic and atraumatic     Right Ear: External ear normal    Left Ear: External ear normal    Nose: Nose normal    Mouth/Throat: Oropharynx is clear and moist    Eyes: Conjunctivae and EOM are normal  Pupils are equal, round, and reactive to light    Neck: Normal range of motion  Neck supple  No JVD present  No tracheal deviation present  No thyromegaly present  Cardiovascular: Normal rate, regular rhythm, normal heart sounds and intact distal pulses  Exam reveals no gallop and no friction rub  No murmur heard  Pulmonary/Chest: Effort normal and breath sounds normal  No stridor  No respiratory distress  She has no wheezes  She has no rales  She exhibits no tenderness  Abdominal: Soft  Bowel sounds are normal  She exhibits no distension and no mass  There is no tenderness  There is no rebound and no guarding  No hernia  Musculoskeletal: Normal range of motion  She exhibits no edema, tenderness or deformity  Lymphadenopathy:     She has no cervical adenopathy  Neurological: She is alert and oriented to person, place, and time  She has normal reflexes  She displays normal reflexes  No cranial nerve deficit  She exhibits normal muscle tone  Coordination normal    Skin: Skin is warm  No rash noted  No erythema  No pallor  Psychiatric: She expresses impulsivity and inappropriate judgment  She expresses suicidal ideation  She expresses suicidal plans  Nursing note and vitals reviewed        ED Medications  Medications   tetanus-diphtheria-acellular pertussis (BOOSTRIX) IM injection 0 5 mL (0 5 mL Intramuscular Given 2/5/18 6934)   lidocaine-epinephrine (XYLOCAINE/EPINEPHRINE) 1 %-1:100,000 injection 5 mL (5 mL Infiltration Given 2/5/18 9925)   ibuprofen (MOTRIN) tablet 400 mg (400 mg Oral Given 2/5/18 1457)       Diagnostic Studies  Results Reviewed     Procedure Component Value Units Date/Time    Rapid drug screen, urine [[id number]]  (Normal) Collected:  02/05/18 0441    Lab Status:  Final result Specimen:  Urine from Urine, Clean Catch Updated:  02/05/18 0518     Amph/Meth UR Negative     Barbiturate Ur Negative     Benzodiazepine Urine Negative     Cocaine Urine Negative     Methadone Urine Negative     Opiate Urine Negative     PCP Ur Negative     THC Urine Negative    Narrative:         FOR MEDICAL PURPOSES ONLY  IF CONFIRMATION NEEDED PLEASE CONTACT THE LAB WITHIN 5 DAYS  Drug Screen Cutoff Levels:  AMPHETAMINE/METHAMPHETAMINES  1000 ng/mL  BARBITURATES     200 ng/mL  BENZODIAZEPINES     200 ng/mL  COCAINE      300 ng/mL  METHADONE      300 ng/mL  OPIATES      300 ng/mL  PHENCYCLIDINE     25 ng/mL  THC       50 ng/mL    POCT alcohol breath test [[id number]]  (Normal) Resulted:  02/05/18 0426    Lab Status:  Final result Updated:  02/05/18 0426     EXTBreath Alcohol 0 187                 No orders to display         Procedures  Lac Repair  Date/Time: 2/5/2018 4:53 AM  Performed by: Jose Castillo  Authorized by: Martin Burciaga   Consent: Verbal consent obtained    Consent given by: patient  Patient identity confirmed: verbally with patient  Body area: upper extremity  Location details: left wrist  Laceration length: 3 cm  Tendon involvement: none  Nerve involvement: none  Vascular damage: no  Anesthesia: local infiltration    Anesthesia:  Local Anesthetic: lidocaine 1% with epinephrine  Anesthetic total: 5 mL    Sedation:  Patient sedated: no    Wound Dehiscence:  Superficial Wound Dehiscence: simple closure      Procedure Details:  Irrigation solution: saline  Irrigation method: syringe  Amount of cleaning: extensive  Skin closure: 5-0 nylon  Number of sutures: 4  Technique: simple  Approximation: close  Approximation difficulty: simple  Patient tolerance: Patient tolerated the procedure well with no immediate complications            Phone Consults  ED Phone Contact    ED Course  ED Course                                MDM  Number of Diagnoses or Management Options  Alcohol abuse: new and requires workup  Depression: new and requires workup     Amount and/or Complexity of Data Reviewed  Clinical lab tests: ordered and reviewed  Tests in the radiology section of CPT®: ordered and reviewed  Tests in the medicine section of CPT®: reviewed and ordered  Decide to obtain previous medical records or to obtain history from someone other than the patient: yes  Independent visualization of images, tracings, or specimens: yes    Patient Progress  Patient progress: stable    CritCare Time    Disposition  Final diagnoses:   Depression   Alcohol abuse     Time reflects when diagnosis was documented in both MDM as applicable and the Disposition within this note     Time User Action Codes Description Comment    2/5/2018  1:34 PM Damon Yung Add [F32 9] Depression     2/5/2018  1:34 PM Damon Yung Add [F10 10] Alcohol abuse       ED Disposition     ED Disposition Condition Comment    Discharge  Marcy Boxer discharge to home/self care  Condition at discharge: Good        Follow-up Information     Follow up With Specialties Details Why Contact Info    [facility], [address] Nurse Practitioner Schedule an appointment as soon as possible for a visit For further management Jeovany Weber 3  [phone]          Discharge Medication List as of 2/5/2018  1:36 PM      CONTINUE these medications which have NOT CHANGED    Details   Thyroid (LEVOTHYROXINE-LIOTHYRONINE PO) Take by mouth, Historical Med      naproxen (NAPROSYN) 500 mg tablet Take 1 tablet by mouth 2 (two) times a day with meals for 30 doses, Starting Tue 8/29/2017, Until Wed 9/13/2017, Print      ondansetron (ZOFRAN-ODT) 4 mg disintegrating tablet Take 1 tablet by mouth every 8 (eight) hours as needed for nausea or vomiting for up to 20 doses, Starting Tue 8/29/2017, Print      oxyCODONE-acetaminophen (PERCOCET) 5-325 mg per tablet Take 1 tablet by mouth every 4 (four) hours as needed for moderate pain for up to 12 doses Max Daily Amount: 6 tablets, Starting Tue 8/29/2017, Print      tamsulosin (FLOMAX) 0 4 mg Take 1 capsule by mouth daily with dinner for 7 doses, Starting Tue 8/29/2017, Until Tue 9/5/2017, Print           No discharge procedures on file      ED Provider  Attending physically available and evaluated Nicolle Iglesias  WILLIAM managed the patient along with the ED Attending      Electronically Signed by         Francis Cat DO  02/06/18 8889

## 2018-02-05 NOTE — ED ATTENDING ATTESTATION
Jaison Bertrand MD, saw and evaluated the patient  I have discussed the patient with the resident/non-physician practitioner and agree with the resident's/non-physician practitioner's findings, Plan of Care, and MDM as documented in the resident's/non-physician practitioner's note, except where noted  All available labs and Radiology studies were reviewed  At this point I agree with the current assessment done in the Emergency Department  I have conducted an independent evaluation of this patient including a focused history of:    Emergency Department Note- Maureen Barker 28 y o  female MRN: [id number]    Unit/Bed#: ED 07 Encounter: [id number]    Maureen Barker is a 28 y o  female who presents with   Chief Complaint   Patient presents with    Psychiatric Evaluation     as per ems - patient having trouble at home, cut wrist this evening "because she tired of what she's going through"         History of Present Illness   HPI:  Maureen Barker is a 28 y o  female who presents for evaluation of:  Depression and suicidal ideation  Patient was drinking etoh earlier in the evening  Patient cut left forearm  Review of Systems   Constitutional: Positive for fatigue  Negative for fever  Psychiatric/Behavioral: Positive for confusion, decreased concentration and dysphoric mood  All other systems reviewed and are negative  Historical Information   Past Medical History:   Diagnosis Date    Asthma     Disease of thyroid gland     Kidney stones      No past surgical history on file    Social History   History   Alcohol Use No     History   Drug Use No     History   Smoking Status    Never Smoker   Smokeless Tobacco    Not on file     Family History: non-contributory    Meds/Allergies   all medications and allergies reviewed  No Known Allergies    Objective   First Vitals:   Blood Pressure: 119/74 (02/05/18 0413)  Pulse: 96 (02/05/18 0413)  Temperature: 97 7 °F (36 5 °C) (02/05/18 0413)  Temp Source: Oral (18)  Respirations: 20 (18)  Height: 5' 2" (157 5 cm) (18)  Weight - Scale: 71 7 kg (158 lb) (18)  SpO2: 96 % (18)    Current Vitals:   Blood Pressure: 119/74 (18)  Pulse: 96 (18)  Temperature: 97 7 °F (36 5 °C) (18)  Temp Source: Oral (18)  Respirations: 20 (18)  Height: 5' 2" (157 5 cm) (18)  Weight - Scale: 71 7 kg (158 lb) (18)  SpO2: 96 % (18)    No intake or output data in the 24 hours ending 18    Invasive Devices          No matching active lines, drains, or airways          Physical Exam   Constitutional: She is oriented to person, place, and time  She appears well-developed and well-nourished  HENT:   Head: Normocephalic and atraumatic  Musculoskeletal: Normal range of motion  She exhibits no deformity  Neurological: She is alert and oriented to person, place, and time  Skin: Skin is warm and dry  Psychiatric: Her behavior is normal  Judgment and thought content normal    Nursing note and vitals reviewed  Medical Decision Makin  Acute depression with suicidal ideaetion    No results found for this or any previous visit (from the past 39 hour(s))  No orders to display         Portions of the record may have been created with voice recognition software  Occasional wrong word or "sound a like" substitutions may have occurred due to the inherent limitations of voice recognition software  Read the chart carefully and recognize, using context, where substitutions have occurred

## 2018-02-05 NOTE — ED NOTES
Pt is a 28 y o  female who was brought to the ED with   Chief Complaint   Patient presents with   [facility] Psychiatric Evaluation     as per ems - patient having trouble at home, cut wrist this evening "because she tired of what she's going through"   Pt brought to the ED with complaints of increased depression, for the past 2 months, Etoh intoxication pt reports that she had 3 cups of rum , S/I  with plan to cut wrist (left) while she was intoxicated  pt reports S/i for the past few days, Pt reports that her stressor are her current probation, and that she cant leave the state until she has completed her probation Pt now denies any S/I, H/I,A/H,V/H  Intake Assessment completed, Safety risk Assessment completed  CW and pt discussed her situation, pt reports plan to get out pt counseling set up, so she can deal with her depression  CW discussed this case and pt plan with ED Physician who is in agreement with this plan  Pt will be discharged per ED Physician, Dylan Dubon gave pt a referral for out pt follow up care        [address] [place] [address]

## 2018-02-07 ENCOUNTER — HOSPITAL ENCOUNTER (EMERGENCY)
Facility: HOSPITAL | Age: 28
Discharge: HOME | End: 2018-02-07
Attending: EMERGENCY MEDICINE | Admitting: EMERGENCY MEDICINE
Payer: MEDICARE

## 2018-02-07 VITALS
HEIGHT: 62 IN | OXYGEN SATURATION: 96 % | TEMPERATURE: 98.3 F | WEIGHT: 148 LBS | RESPIRATION RATE: 16 BRPM | HEART RATE: 73 BPM | DIASTOLIC BLOOD PRESSURE: 70 MMHG | BODY MASS INDEX: 27.23 KG/M2 | SYSTOLIC BLOOD PRESSURE: 135 MMHG

## 2018-02-07 DIAGNOSIS — S61.512A LACERATION OF LEFT WRIST WITH COMPLICATION, INITIAL ENCOUNTER: Primary | ICD-10-CM

## 2018-02-07 PROCEDURE — 99282 EMERGENCY DEPT VISIT SF MDM: CPT

## 2018-02-07 RX ORDER — CEPHALEXIN 250 MG/1
500 CAPSULE ORAL EVERY 8 HOURS SCHEDULED
Status: DISCONTINUED | OUTPATIENT
Start: 2018-02-07 | End: 2018-02-07 | Stop reason: HOSPADM

## 2018-02-07 RX ORDER — CEPHALEXIN 500 MG/1
500 CAPSULE ORAL EVERY 8 HOURS SCHEDULED
Qty: 21 CAPSULE | Refills: 0 | Status: SHIPPED | OUTPATIENT
Start: 2018-02-07 | End: 2018-02-14

## 2018-02-07 RX ORDER — CEPHALEXIN 500 MG/1
500 CAPSULE ORAL EVERY 8 HOURS SCHEDULED
Qty: 21 CAPSULE | Refills: 0 | Status: SHIPPED | OUTPATIENT
Start: 2018-02-07 | End: 2018-02-07

## 2018-02-07 NOTE — ED ATTENDING ATTESTATION
Natalie Paiz MD, saw and evaluated the patient  I have discussed the patient with the resident/non-physician practitioner and agree with the resident's/non-physician practitioner's findings, Plan of Care, and MDM as documented in the resident's/non-physician practitioner's note, except where noted  All available labs and Radiology studies were reviewed  At this point I agree with the current assessment done in the Emergency Department  I have conducted an independent evaluation of this patient a history and physical is as follows:      Critical Care Time  CritCare Time    Procedures     29 yo female with purulent drainage, warmth, redness at site of laceration which was sutured 2 days ago on wrist   No fever  Vss, afebrile, lungs cta, rrr, left wrist laceration with mild cellulitis and serosanguinous drainage  abx

## 2018-02-07 NOTE — ED PROVIDER NOTES
History  Chief Complaint   Patient presents with    Wound Check     Pt c/o pus coming from a sutured wound on her wrist for 2 days     Patient is a 39-year-old female here today for a wound check to re-evaluate a healing 4 centimeter laceration on the midline left anterior aspect of her wrist extending from approximately 2 inches proximal to the wrist line to 4 inches proximal to the wrist   The wound was closed with primary intention on 2/5/2018 in the emergency department at [facility] by Dr Jacob Maya  Four simple interrupted nylon sutures in place with minimal serosanguineous drainage  Patient stated that there was more profound purulent drainage this morning prior to cleaning it herself  There was minimal erythema present around the laceration site  Minimal calor noted in the area  No fluctuance or crepitation on my exam   Minimal amount of serous fluid was expressed  Patient denies any symptoms of systemic infection; no fevers, chills, nausea, vomiting, diarrhea, chest pain, shortness of breath, difficulty breathing, headache, changes to bowel, and or bladder function  Prior to Admission Medications   Prescriptions Last Dose Informant Patient Reported? Taking? Thyroid (LEVOTHYROXINE-LIOTHYRONINE PO)   Yes No   Sig: Take 25 mcg by mouth        Facility-Administered Medications: None       Past Medical History:   Diagnosis Date    Asthma     Disease of thyroid gland     Kidney stones        History reviewed  No pertinent surgical history  History reviewed  No pertinent family history  I have reviewed and agree with the history as documented  Social History   Substance Use Topics    Smoking status: Never Smoker    Smokeless tobacco: Never Used    Alcohol use Yes        Review of Systems   Constitutional: Negative for activity change, chills, fatigue and fever  Respiratory: Negative for cough, chest tightness, shortness of breath and wheezing      Cardiovascular: Negative for chest pain, palpitations and leg swelling  Gastrointestinal: Negative for abdominal distention, abdominal pain, constipation, diarrhea, nausea and vomiting  Genitourinary: Negative for difficulty urinating and dyspareunia  Musculoskeletal: Negative for arthralgias, back pain, joint swelling, myalgias and neck stiffness  Skin: Negative for color change and pallor  Neurological: Negative for dizziness, weakness, light-headedness and headaches  Physical Exam  ED Triage Vitals [02/07/18 1255]   Temperature Pulse Respirations Blood Pressure SpO2   98 3 °F (36 8 °C) 73 16 135/70 96 %      Temp Source Heart Rate Source Patient Position - Orthostatic VS BP Location FiO2 (%)   Oral Monitor -- -- --      Pain Score       6           Orthostatic Vital Signs  Vitals:    02/07/18 1255   BP: 135/70   Pulse: 73       Physical Exam   Constitutional: She is oriented to person, place, and time  She appears well-developed and well-nourished  No distress  Noted old female, no acute distress resting comfortably in the bed  Patient has a dressing in place on her left wrist shows clean dry and intact  HENT:   Head: Normocephalic and atraumatic  Mouth/Throat: No oropharyngeal exudate  Eyes: Pupils are equal, round, and reactive to light  No scleral icterus  Neck: Normal range of motion  Neck supple  No JVD present  Cardiovascular: Normal rate, regular rhythm and normal heart sounds  Exam reveals no gallop and no friction rub  No murmur heard  Pulmonary/Chest: Effort normal and breath sounds normal  No respiratory distress  She has no wheezes  She has no rales  Abdominal: Soft  Bowel sounds are normal  She exhibits no distension and no mass  There is no tenderness  There is no rebound and no guarding  Musculoskeletal: Normal range of motion  Left wrist dressing clean dry and intact  Patient has good sensation intact in all 5 digits on the left hand    Patient is able to move all 5 digits in all directions without limitations  Patient has 5/5  strength (this does elicit some minor pain in the area of the laceration)  There is a minimal area of count or noted around the laceration site, there is a minimal area of erythema around the laceration site, there is minimal serosanguineous spontaneous drainage, there is minimal serous drainage with expression  4 Nylon  simple interrupted sutures intact and in place  Brisk capillary refill noted in all 5 digits  Neurological: She is alert and oriented to person, place, and time  No cranial nerve deficit  Skin: Skin is warm and dry  She is not diaphoretic  Nursing note and vitals reviewed  ED Medications  Medications   cephalexin (KEFLEX) capsule 500 mg (not administered)       Diagnostic Studies  Results Reviewed     None                 No orders to display         Procedures  Procedures      Phone Consults  ED Phone Contact    ED Course  ED Course                                MDM  Number of Diagnoses or Management Options  Diagnosis management comments: Given the active drainage from the laceration site, now is appropriate to begin antibiotics will start the patient on Keflex 500 milligrams t i d  for 7 day course  Patient is advised is to return to the emergency department or to her primary care provider for follow-up evaluation if the wound continues to worsen over the course of the next 3 days or if she develops any of the following symptoms; fevers, chills, nausea, vomiting      CritCare Time    Disposition  Final diagnoses:   Laceration of left wrist with complication, initial encounter     Time reflects when diagnosis was documented in both MDM as applicable and the Disposition within this note     Time User Action Codes Description Comment    2/7/2018  1:26 PM Tarsha Cesar Add [[id number]] Laceration of left wrist with complication, initial encounter       ED Disposition     ED Disposition Condition Comment    Discharge  [place] discharge to home/self care  Condition at discharge: Good    It appears as though that the patient's wound was closed with primary intention on 2/5/2018 as now developed a infection  Patient will be started on Keflex 500 milligrams t  i d  for the next 7 days  Patient has been advised to return to the emergency department or her primary care provider if the wound continues to drain after 3 days or if the patient develops any of the following symptoms:  Fever, chills, nausea, vomiting   Follow-up Information    None       Patient's Medications   Discharge Prescriptions    No medications on file     No discharge procedures on file  ED Provider  Attending physically available and evaluated Karen Newell I managed the patient along with the ED Attending      Electronically Signed by         Markel Gonzalez DO  02/07/18 9916

## 2018-02-07 NOTE — DISCHARGE INSTRUCTIONS
Laceration   WHAT YOU NEED TO KNOW:   A laceration is an injury to the skin and the soft tissue underneath it  Lacerations happen when you are cut or hit by something  They can happen anywhere on the body  DISCHARGE INSTRUCTIONS:   Return to the emergency department if:   · You have heavy bleeding or bleeding that does not stop after 10 minutes of holding firm, direct pressure over the wound  · Your wound opens up  Contact your healthcare provider if:   · You have a fever or chills  · Your laceration is red, warm, or swollen  · You have red streaks on your skin coming from your wound  · You have white or yellow drainage from the wound that smells bad  · You have pain that gets worse, even after treatment  · You have questions or concerns about your condition or care  Medicines:   · Prescription pain medicine  may be given  Ask how to take this medicine safely  · Antibiotics  help treat or prevent a bacterial infection  · Take your medicine as directed  Contact your healthcare provider if you think your medicine is not helping or if you have side effects  Tell him or her if you are allergic to any medicine  Keep a list of the medicines, vitamins, and herbs you take  Include the amounts, and when and why you take them  Bring the list or the pill bottles to follow-up visits  Carry your medicine list with you in case of an emergency  Care for your wound as directed:   · Do not get your wound wet  until your healthcare provider says it is okay  Do not soak your wound in water  Do not go swimming until your healthcare provider says it is okay  Carefully wash the wound with soap and water  Gently pat the area dry or allow it to air dry  · Change your bandages  when they get wet, dirty, or after washing  Apply new, clean bandages as directed  Do not apply elastic bandages or tape too tight  Do not put powders or lotions over your incision  · Apply antibiotic ointment as directed  Your healthcare provider may give you antibiotic ointment to put over your wound if you have stitches  If you have strips of tape over your incision, let them dry up and fall off on their own  If they do not fall off within 14 days, gently remove them  If you have glue over your wound, do not remove or pick at it  If your glue comes off, do not replace it with glue that you have at home  · Check your wound every day for signs of infection such as swelling, redness, or pus  Self-care:   · Apply ice  on your wound for 15 to 20 minutes every hour or as directed  Use an ice pack, or put crushed ice in a plastic bag  Cover it with a towel  Ice helps prevent tissue damage and decreases swelling and pain  · Use a splint as directed  A splint will decrease movement and stress on your wound  It may help it heal faster  A splint may be used for lacerations over joints or areas of your body that bend  Ask your healthcare provider how to apply and remove a splint  · Decrease scarring of your wound  by applying ointments as directed  Do not apply ointments until your healthcare provider says it is okay  You may need to wait until your wound is healed  Ask which ointment to buy and how often to use it  After your wound is healed, use sunscreen over the area when you are out in the sun  You should do this for at least 6 months to 1 year after your injury  Follow up with your healthcare provider as directed: You may need to follow up in 24 to 48 hours to have your wound checked for infection  You will need to return in 3 to 14 days if you have stitches or staples so they can be removed  Care for your wound as directed to prevent infection and help it heal  Write down your questions so you remember to ask them during your visits  © 2017 [employer] Information is for End User's use only and may not be sold, redistributed or otherwise used for commercial purposes   All illustrations and images included in [facility] are the copyrighted property of [employer]  or [employer]  The above information is an  only  It is not intended as medical advice for individual conditions or treatments  Talk to your doctor, nurse or pharmacist before following any medical regimen to see if it is safe and effective for you

## 2018-02-14 ENCOUNTER — OFFICE VISIT (OUTPATIENT)
Dept: FAMILY MEDICINE CLINIC | Facility: CLINIC | Age: 28
End: 2018-02-14
Payer: MEDICARE

## 2018-02-14 VITALS
DIASTOLIC BLOOD PRESSURE: 76 MMHG | TEMPERATURE: 95.5 F | WEIGHT: 147 LBS | SYSTOLIC BLOOD PRESSURE: 118 MMHG | HEIGHT: 63 IN | RESPIRATION RATE: 16 BRPM | HEART RATE: 84 BPM | BODY MASS INDEX: 26.05 KG/M2

## 2018-02-14 DIAGNOSIS — S61.519D: ICD-10-CM
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
## 2018-02-14 DIAGNOSIS — Z48.02 VISIT FOR SUTURE REMOVAL: Primary | ICD-10-CM

## 2018-02-14 DIAGNOSIS — E03.9 HYPOTHYROIDISM, UNSPECIFIED TYPE: ICD-10-CM

## 2018-02-14 PROBLEM — T14.91XA SUICIDE ATTEMPT (HCC): Status: ACTIVE | Noted: 2018-02-14

## 2018-02-14 PROBLEM — E28.2 PCOS (POLYCYSTIC OVARIAN SYNDROME): Status: ACTIVE | Noted: 2018-02-14

## 2018-02-14 PROBLEM — F32.A DEPRESSION: Status: ACTIVE | Noted: 2018-02-14

## 2018-02-14 PROBLEM — J45.909 ASTHMA: Status: ACTIVE | Noted: 2018-02-14

## 2018-02-14 PROBLEM — R73.01 IMPAIRED FASTING GLUCOSE: Status: ACTIVE | Noted: 2018-02-14

## 2018-02-14 PROCEDURE — 99215 OFFICE O/P EST HI 40 MIN: CPT | Performed by: FAMILY MEDICINE

## 2018-02-14 RX ORDER — FLUTICASONE PROPIONATE 110 UG/1
AEROSOL, METERED RESPIRATORY (INHALATION)
COMMUNITY
Start: 2011-10-18 | End: 2018-11-09 | Stop reason: SDUPTHER

## 2018-02-14 RX ORDER — LEVOTHYROXINE SODIUM 0.03 MG/1
TABLET ORAL
COMMUNITY
Start: 2018-01-16 | End: 2018-04-05 | Stop reason: SDUPTHER
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
## 2018-02-16 ENCOUNTER — PATIENT OUTREACH (OUTPATIENT)
Dept: FAMILY MEDICINE CLINIC | Facility: CLINIC | Age: 28
End: 2018-02-16

## 2018-02-20 ENCOUNTER — PATIENT OUTREACH (OUTPATIENT)
Dept: FAMILY MEDICINE CLINIC | Facility: CLINIC | Age: 28
End: 2018-02-20

## 2018-02-20 ENCOUNTER — TELEPHONE (OUTPATIENT)
Dept: FAMILY MEDICINE CLINIC | Facility: CLINIC | Age: 28
End: 2018-02-20

## 2018-02-27 ENCOUNTER — OFFICE VISIT (OUTPATIENT)
Dept: FAMILY MEDICINE CLINIC | Facility: CLINIC | Age: 28
End: 2018-02-27
Payer: MEDICARE

## 2018-02-27 ENCOUNTER — PATIENT OUTREACH (OUTPATIENT)
Dept: FAMILY MEDICINE CLINIC | Facility: CLINIC | Age: 28
End: 2018-02-27

## 2018-02-27 VITALS
SYSTOLIC BLOOD PRESSURE: 104 MMHG | WEIGHT: 149.4 LBS | DIASTOLIC BLOOD PRESSURE: 80 MMHG | HEART RATE: 80 BPM | TEMPERATURE: 97.4 F | HEIGHT: 63 IN | RESPIRATION RATE: 18 BRPM | BODY MASS INDEX: 26.47 KG/M2

## 2018-02-27 DIAGNOSIS — E03.9 HYPOTHYROIDISM, UNSPECIFIED TYPE: Primary | ICD-10-CM

## 2018-02-27 DIAGNOSIS — F32.A DEPRESSION, UNSPECIFIED DEPRESSION TYPE: ICD-10-CM

## 2018-02-27 PROCEDURE — 99213 OFFICE O/P EST LOW 20 MIN: CPT | Performed by: NURSE PRACTITIONER

## 2018-02-27 NOTE — ASSESSMENT & PLAN NOTE
Refer behavioral med  Refer to Stacy again our  for assistance in setting up counseling and psychiatry appointment  They are meeting in person and medially after this office visit

## 2018-02-27 NOTE — PROGRESS NOTES
Oriana Baron 1990 female MRN: [id number]    Family Medicine Follow-up Visit    ASSESSMENT/PLAN  Problem List Items Addressed This Visit     Depression     Refer behavioral med  Refer to Rosa M Nina again our  for assistance in setting up counseling and psychiatry appointment  They are meeting in person and medially after this office visit  Relevant Orders    Ambulatory referral to Behavioral Health    Hypothyroid - Primary     TSH due in 1-2 weeks, will adjust med as needed  Will follow up by phone after those results are and adjust medication accordingly  Schedule follow-up here in 6-12 weeks to follow-up on thyroid disease and depression and counseling  Relevant Orders    TSH, 3rd generation              No future appointments  SUBJECTIVE  CC: Follow-up      HPI:  Oriana Baron is a 28 y o  female who presents for follow up on hypothyroidism  Since her last visit with me (1/10/2018) she's had 2 ED visits in early Feb- suicide attempt/alcohol abuse/left wrist laceration on 2/5/18 and wound check on 2/7/18  She came here 2/14/18 for suture removal and was referred to [facility] worker- to help with setting up psychiatry and counseling  She has not set up appts, reports she did get call from Rosa M Nina, but does not have an appointment    She states she knows she needs to  Presently denies SI/HI  Has crisis and warmline numbers  Taking thyroid medication as directed  Due for recheck of TSH in 2 weeks  Review of Systems   Constitutional: Positive for fatigue  Negative for fever and unexpected weight change  Skin: Negative for rash  Left wrist healing without complications   Psychiatric/Behavioral: Negative for self-injury and suicidal ideas         Historical Information   The patient history was reviewed as follows:    Past Medical History:   Diagnosis Date    Asthma     Disease of thyroid gland     Kidney stones      No past surgical history on file   Family History   Problem Relation Age of Onset    No Known Problems Mother     Diabetes Family     Hypertension Family       Social History   History   Alcohol Use    Yes     Comment: SOCIAL      History   Drug Use No     Comment: HISTORY OF USING MARIJUANA: DAILY USE- AS PER ALL SCRIPTS      History   Smoking Status    Never Smoker   Smokeless Tobacco    Never Used     Comment: FORMER SMOKER AS PER ALL SCRIPTS- QUIT IN 2012       Medications:     Current Outpatient Prescriptions:     fluticasone (FLOVENT HFA) 110 MCG/ACT inhaler, Inhale, Disp: , Rfl:     levothyroxine 25 mcg tablet, Take by mouth, Disp: , Rfl:     Thyroid (LEVOTHYROXINE-LIOTHYRONINE PO), Take 25 mcg by mouth  , Disp: , Rfl:     VENTOLIN  (90 Base) MCG/ACT inhaler, Inhale 2 puffs, Disp: , Rfl: 0  No Known Allergies    OBJECTIVE    Vitals:   Vitals:    02/27/18 1131   BP: 104/80   Pulse: 80   Resp: 18   Temp: (!) 97 4 °F (36 3 °C)   Weight: 67 8 kg (149 lb 6 4 oz)   Height: 5' 2 5" (1 588 m)           Physical Exam   Constitutional: She appears well-developed and well-nourished  HENT:   Head: Normocephalic and atraumatic  Mouth/Throat: Oropharynx is clear and moist    Skin:   Left wrist wound healing without signs of infection  Psychiatric: She has a normal mood and affect                    Robin Schulte, [address] [place] [address] Family Medicine   3/2/2018

## 2018-02-27 NOTE — ASSESSMENT & PLAN NOTE
TSH due in 1-2 weeks, will adjust med as needed  Will follow up by phone after those results are and adjust medication accordingly  Schedule follow-up here in 6-12 weeks to follow-up on thyroid disease and depression and counseling

## 2018-03-16 DIAGNOSIS — E03.9 HYPOTHYROIDISM: ICD-10-CM

## 2018-03-29 ENCOUNTER — APPOINTMENT (OUTPATIENT)
Dept: LAB | Facility: HOSPITAL | Age: 28
End: 2018-03-29
Payer: COMMERCIAL

## 2018-03-29 DIAGNOSIS — E03.9 HYPOTHYROIDISM, UNSPECIFIED TYPE: ICD-10-CM

## 2018-03-29 DIAGNOSIS — E03.9 HYPOTHYROIDISM: ICD-10-CM

## 2018-03-29 LAB — TSH SERPL DL<=0.05 MIU/L-ACNC: 5.86 UIU/ML (ref 0.36–3.74)

## 2018-03-29 PROCEDURE — 84443 ASSAY THYROID STIM HORMONE: CPT

## 2018-03-29 PROCEDURE — 36415 COLL VENOUS BLD VENIPUNCTURE: CPT

## 2018-04-05 RX ORDER — LEVOTHYROXINE SODIUM 0.03 MG/1
50 TABLET ORAL DAILY
Qty: 60 TABLET | Refills: 2 | Status: SHIPPED | OUTPATIENT
Start: 2018-04-05 | End: 2018-05-27 | Stop reason: SDUPTHER

## 2018-04-06 ENCOUNTER — TELEPHONE (OUTPATIENT)
Dept: FAMILY MEDICINE CLINIC | Facility: CLINIC | Age: 28
End: 2018-04-06

## 2018-04-18 DIAGNOSIS — E03.9 ACQUIRED HYPOTHYROIDISM: Primary | ICD-10-CM

## 2018-05-27 DIAGNOSIS — E03.9 HYPOTHYROIDISM: ICD-10-CM

## 2018-05-29 DIAGNOSIS — E03.9 HYPOTHYROIDISM, UNSPECIFIED TYPE: ICD-10-CM

## 2018-05-29 RX ORDER — LEVOTHYROXINE SODIUM 0.03 MG/1
TABLET ORAL
Qty: 60 TABLET | Refills: 2 | Status: SHIPPED | OUTPATIENT
Start: 2018-05-29 | End: 2018-11-09 | Stop reason: SDUPTHER

## 2018-06-01 RX ORDER — LEVOTHYROXINE SODIUM 0.05 MG/1
50 TABLET ORAL DAILY
Qty: 30 TABLET | Refills: 2 | Status: SHIPPED | OUTPATIENT
Start: 2018-06-01 | End: 2018-09-20 | Stop reason: SDUPTHER

## 2018-09-19 ENCOUNTER — TELEPHONE (OUTPATIENT)
Dept: FAMILY MEDICINE CLINIC | Facility: CLINIC | Age: 28
End: 2018-09-19

## 2018-09-19 DIAGNOSIS — E03.9 HYPOTHYROIDISM, UNSPECIFIED TYPE: Primary | ICD-10-CM

## 2018-09-19 NOTE — TELEPHONE ENCOUNTER
Pt checking on refill on Levothyroxine 50mg  She said Pharmacy was going to contact us to refill    She's all out

## 2018-09-19 NOTE — TELEPHONE ENCOUNTER
Please review, at last TSH it was noted to repeat in 8 weeks  There does not appear to be an order in system

## 2018-09-20 DIAGNOSIS — E03.9 HYPOTHYROIDISM, UNSPECIFIED TYPE: ICD-10-CM

## 2018-09-20 RX ORDER — LEVOTHYROXINE SODIUM 0.05 MG/1
50 TABLET ORAL DAILY
Qty: 30 TABLET | Refills: 2 | Status: SHIPPED | OUTPATIENT
Start: 2018-09-20 | End: 2018-11-09 | Stop reason: SDUPTHER

## 2018-09-20 NOTE — TELEPHONE ENCOUNTER
Although Rachel Adams ordered lab work, she did not renew Levothyroxine  Spoke with patient, she has been out of meds for 3 days, entered order for renewal if you approve   Mailed lab order to her home

## 2018-09-21 PROBLEM — Z48.02 VISIT FOR SUTURE REMOVAL: Status: RESOLVED | Noted: 2018-02-14 | Resolved: 2018-09-21

## 2018-09-26 ENCOUNTER — APPOINTMENT (OUTPATIENT)
Dept: LAB | Facility: HOSPITAL | Age: 28
End: 2018-09-26
Payer: MEDICARE

## 2018-09-26 DIAGNOSIS — E03.9 HYPOTHYROIDISM, UNSPECIFIED TYPE: ICD-10-CM

## 2018-09-26 LAB
T4 FREE SERPL-MCNC: 0.87 NG/DL (ref 0.76–1.46)
TSH SERPL DL<=0.05 MIU/L-ACNC: 10.5 UIU/ML (ref 0.36–3.74)

## 2018-09-26 PROCEDURE — 36415 COLL VENOUS BLD VENIPUNCTURE: CPT

## 2018-09-26 PROCEDURE — 84439 ASSAY OF FREE THYROXINE: CPT

## 2018-09-26 PROCEDURE — 84443 ASSAY THYROID STIM HORMONE: CPT

## 2018-11-05 ENCOUNTER — OFFICE VISIT (OUTPATIENT)
Dept: FAMILY MEDICINE CLINIC | Facility: CLINIC | Age: 28
End: 2018-11-05
Payer: MEDICARE

## 2018-11-05 VITALS
RESPIRATION RATE: 16 BRPM | BODY MASS INDEX: 29.91 KG/M2 | TEMPERATURE: 97.7 F | SYSTOLIC BLOOD PRESSURE: 122 MMHG | HEIGHT: 63 IN | DIASTOLIC BLOOD PRESSURE: 76 MMHG | WEIGHT: 168.8 LBS | HEART RATE: 76 BPM

## 2018-11-05 DIAGNOSIS — J40 BRONCHITIS: ICD-10-CM

## 2018-11-05 DIAGNOSIS — J45.21 MILD INTERMITTENT ASTHMA WITH EXACERBATION: Primary | ICD-10-CM

## 2018-11-05 PROCEDURE — 99213 OFFICE O/P EST LOW 20 MIN: CPT | Performed by: FAMILY MEDICINE

## 2018-11-05 RX ORDER — PREDNISONE 20 MG/1
40 TABLET ORAL DAILY
Qty: 10 TABLET | Refills: 0 | Status: SHIPPED | OUTPATIENT
Start: 2018-11-05 | End: 2018-11-10

## 2018-11-05 NOTE — ASSESSMENT & PLAN NOTE
- Peak flow 200-230 reduced from ideal value of 435 by more than 20%  - Prednisone 40 mg daily for 5 days  - Advised to continue to use albuterol PRN every 4-6 hours   - Return precautions discussed, otherwise follow-up as scheduled on Friday

## 2018-11-05 NOTE — ASSESSMENT & PLAN NOTE
- Likely viral, supportive care discussed with Mucinex as expectorant   - Advised to call if symptoms worsen, otherwise will return on Friday to reassess

## 2018-11-05 NOTE — PROGRESS NOTES
Emery Mehta 1990 female MRN: [id number]    Family Medicine Acute Visit    ASSESSMENT/PLAN  Problem List Items Addressed This Visit        Respiratory    Bronchitis     - Likely viral, supportive care discussed with Mucinex as expectorant   - Advised to call if symptoms worsen, otherwise will return on Friday to reassess         Mild intermittent asthma with exacerbation - Primary     - Peak flow 200-230 reduced from ideal value of 435 by more than 20%  - Prednisone 40 mg daily for 5 days  - Advised to continue to use albuterol PRN every 4-6 hours   - Return precautions discussed, otherwise follow-up as scheduled on Friday          Relevant Medications    predniSONE 20 mg tablet          Follow-up appointment previously scheduled for 11/9 to review blood work, will also follow-up acute illness at that time  Future Appointments  Date Time Provider Mariano Patel   11/9/2018 2:40 PM DO JULES Manzo [facility]          SUBJECTIVE  CC: Cough      HPI:  Emery Mehta is a 33 y o  female with past medical history significant for mild intermittent asthma and hypothyroidism who presents for evaluation of cold-like symptoms  She states that this all started 2 weeks ago and began initially with fever, chills, runny nose and sore throat  She states that she has not taken her temperature, but has felt warm  4 days ago she started with a cough and has had green sputum, and she is also noticed yellow green mucus from her nose  She states that she has had some shortness of breath with wheezing, and she has been using her albuterol inhaler 4 to 5 times per day with temporary relief  She denies any chest pain, denies any nausea vomiting or abdominal pain  She states that she had diarrhea for the past 2 weeks, but that has since resolved as of yesterday  She has tried DayQuil and Robitussin with minimal relief  She states that her coughing is much worse at night    She denies any recent travel and denies any sick contacts  Review of Systems   Constitutional: Positive for chills, fatigue and fever  HENT: Positive for congestion, rhinorrhea and sore throat  Negative for ear discharge and ear pain  Eyes: Negative for discharge and redness  Respiratory: Positive for cough, chest tightness, shortness of breath and wheezing  Cardiovascular: Negative for chest pain and palpitations  Gastrointestinal: Positive for diarrhea  Negative for abdominal pain, constipation, nausea and vomiting  Genitourinary: Negative for decreased urine volume and difficulty urinating  Skin: Negative for pallor and rash  Neurological: Negative for dizziness, weakness, light-headedness, numbness and headaches  Historical Information   The patient history was reviewed as follows:  Past Medical History:   Diagnosis Date    Asthma     Disease of thyroid gland     Kidney stones          No past surgical history on file    Family History   Problem Relation Age of Onset    No Known Problems Mother     Diabetes Family     Hypertension Family       Social History   History   Alcohol Use    Yes     Comment: SOCIAL      History   Drug Use No     Comment: HISTORY OF USING MARIJUANA: DAILY USE- AS PER ALL SCRIPTS      History   Smoking Status    Never Smoker   Smokeless Tobacco    Never Used     Comment: FORMER SMOKER AS PER ALL SCRIPTS- QUIT IN 2012       Medications:     Current Outpatient Prescriptions:     fluticasone (FLOVENT HFA) 110 MCG/ACT inhaler, Inhale, Disp: , Rfl:     levothyroxine 25 mcg tablet, take 2 tablets by mouth once daily, Disp: 60 tablet, Rfl: 2    levothyroxine 50 mcg tablet, Take 1 tablet (50 mcg total) by mouth daily, Disp: 30 tablet, Rfl: 2    predniSONE 20 mg tablet, Take 2 tablets (40 mg total) by mouth daily for 5 days, Disp: 10 tablet, Rfl: 0    VENTOLIN  (90 Base) MCG/ACT inhaler, Inhale 2 puffs, Disp: , Rfl: 0    No Known Allergies    OBJECTIVE  Vitals:   Vitals: 11/05/18 1341   BP: 122/76   Pulse: 76   Resp: 16   Temp: 97 7 °F (36 5 °C)   Weight: 76 6 kg (168 lb 12 8 oz)   Height: 5' 3 3" (1 608 m)         Physical Exam   Constitutional: She is oriented to person, place, and time  She appears well-developed and well-nourished  No distress  HENT:   Head: Normocephalic and atraumatic  Right Ear: External ear normal    Left Ear: External ear normal    Nose: Nose normal    Mouth/Throat: Oropharynx is clear and moist  No oropharyngeal exudate  TM clear bilaterally    Eyes: Pupils are equal, round, and reactive to light  Conjunctivae and EOM are normal  Right eye exhibits no discharge  Left eye exhibits no discharge  Neck: Normal range of motion  Neck supple  Cardiovascular: Normal rate, regular rhythm, normal heart sounds and intact distal pulses  No murmur heard  Pulmonary/Chest: Effort normal and breath sounds normal  No respiratory distress  She has no wheezes  She has no rales  No crackles appreciated    Lymphadenopathy:     She has no cervical adenopathy  Neurological: She is alert and oriented to person, place, and time  She exhibits normal muscle tone  Skin: Skin is warm  She is not diaphoretic  Psychiatric: She has a normal mood and affect  Her behavior is normal  Judgment and thought content normal    Vitals reviewed           Sarita Ricci DO, PGY-2  [facility]   11/5/2018

## 2018-11-09 ENCOUNTER — OFFICE VISIT (OUTPATIENT)
Dept: FAMILY MEDICINE CLINIC | Facility: CLINIC | Age: 28
End: 2018-11-09
Payer: COMMERCIAL

## 2018-11-09 VITALS
HEIGHT: 63 IN | HEART RATE: 76 BPM | RESPIRATION RATE: 16 BRPM | BODY MASS INDEX: 30.44 KG/M2 | WEIGHT: 171.8 LBS | SYSTOLIC BLOOD PRESSURE: 116 MMHG | DIASTOLIC BLOOD PRESSURE: 70 MMHG | TEMPERATURE: 97 F

## 2018-11-09 DIAGNOSIS — J40 BRONCHITIS: ICD-10-CM

## 2018-11-09 DIAGNOSIS — J45.21 MILD INTERMITTENT ASTHMA WITH ACUTE EXACERBATION: Primary | ICD-10-CM

## 2018-11-09 DIAGNOSIS — F32.A DEPRESSION, UNSPECIFIED DEPRESSION TYPE: ICD-10-CM

## 2018-11-09 DIAGNOSIS — E03.9 HYPOTHYROIDISM, UNSPECIFIED TYPE: ICD-10-CM

## 2018-11-09 DIAGNOSIS — N97.9 INFERTILITY, FEMALE: ICD-10-CM

## 2018-11-09 PROCEDURE — 99213 OFFICE O/P EST LOW 20 MIN: CPT | Performed by: FAMILY MEDICINE

## 2018-11-09 RX ORDER — LEVOTHYROXINE SODIUM 0.07 MG/1
75 TABLET ORAL DAILY
Qty: 90 TABLET | Refills: 0 | Status: SHIPPED | OUTPATIENT
Start: 2018-11-09 | End: 2019-01-14 | Stop reason: SDUPTHER

## 2018-11-09 RX ORDER — FLUTICASONE PROPIONATE 110 UG/1
2 AEROSOL, METERED RESPIRATORY (INHALATION) 2 TIMES DAILY
Qty: 1 INHALER | Refills: 5 | Status: SHIPPED | OUTPATIENT
Start: 2018-11-09

## 2018-11-09 NOTE — ASSESSMENT & PLAN NOTE
- Concern about inability to get pregnant over couple of years in the setting of hypothyroidism and PCOS  - Amenorrhea for 6 months  - Referral to OB/GYN for further evaluation

## 2018-11-09 NOTE — ASSESSMENT & PLAN NOTE
- Asthma exacerbation in the setting of acute bronchitis   - Significantly improved after course of prednisone (40 mg for 5 days)  - Continue Flovent 2 puff BID with albuterol PRN  - PFT ordered to assess baseline (advised to complete when respiratory symptoms have completely resolved)

## 2018-11-09 NOTE — PROGRESS NOTES
Shannon Dinero 1990 female MRN: [id number]    Family Medicine Follow-up Visit    ASSESSMENT/PLAN  Problem List Items Addressed This Visit        Endocrine    Hypothyroid     - TSH 10 5 with free T4 0 87  - Increase Synthroid 75 mcg  - Repeat TSH in 6 weeks          Relevant Medications    levothyroxine 75 mcg tablet    Other Relevant Orders    TSH, 3rd generation with Free T4 reflex       Respiratory    Mild intermittent asthma with acute exacerbation - Primary     - Asthma exacerbation in the setting of acute bronchitis   - Significantly improved after course of prednisone (40 mg for 5 days)  - Continue Flovent 2 puff BID with albuterol PRN  - PFT ordered to assess baseline (advised to complete when respiratory symptoms have completely resolved)          Relevant Medications    fluticasone (FLOVENT HFA) 110 MCG/ACT inhaler    Other Relevant Orders    Complete pulmonary function test    Bronchitis     - Advised to return with any worsening symptoms          Relevant Medications    fluticasone (FLOVENT HFA) 110 MCG/ACT inhaler       Genitourinary    Infertility, female     - Concern about inability to get pregnant over couple of years in the setting of hypothyroidism and PCOS  - Amenorrhea for 6 months  - Referral to OB/GYN for further evaluation          Relevant Orders    Ambulatory referral to Obstetrics / Gynecology       Other    Depression     - Denies any SI/HI  - Has had difficulty contacting psychotherapy offices/making appointment  - Will reach out to Mounika Gonzalez our  to assist her          Relevant Orders    Ambulatory referral to social work care management program          Follow-up in 3 months or sooner as needed  No future appointments         SUBJECTIVE  CC: Follow-up      HPI:  Shannon Dinero is a 33 y o  female with past medical history significant for intermittent asthma, hypothyroidism, depression with previous suicide attempt, and PCOS who presents for follow-up of asthma exacerbation and to discuss recent blood work  The patient states that she feels significantly better after the course of prednisone, she admits to occasional wheezing but states that now she just has the cough  She denies any shortness of breath  She mentions that she has had a hard time making an appointment for therapy, she has left multiple messages but has not heard back so she gave up  She denies any suicidal or homicidal ideations, states she is doing better overall  She also brings up the concern that she has not had a period for the past 6 months, which she thinks is due to her uncontrolled hypothyroidism  She also has been trying to get pregnant for many years, and has not been able to  Review of Systems   Constitutional: Positive for fatigue  Negative for chills and fever  Respiratory: Positive for cough and wheezing  Negative for shortness of breath  Cardiovascular: Negative for chest pain  Genitourinary: Positive for menstrual problem  Negative for vaginal bleeding  Psychiatric/Behavioral: Negative for suicidal ideas  Historical Information   The patient history was reviewed as follows:    Past Medical History:   Diagnosis Date    Asthma     Disease of thyroid gland     Kidney stones      No past surgical history on file    Family History   Problem Relation Age of Onset    No Known Problems Mother     Diabetes Family     Hypertension Family       Social History   History   Alcohol Use    Yes     Comment: SOCIAL      History   Drug Use No     Comment: HISTORY OF USING MARIJUANA: DAILY USE- AS PER ALL SCRIPTS      History   Smoking Status    Never Smoker   Smokeless Tobacco    Never Used     Comment: FORMER SMOKER AS PER ALL SCRIPTS- QUIT IN 2012       Medications:     Current Outpatient Prescriptions:     fluticasone (FLOVENT HFA) 110 MCG/ACT inhaler, Inhale 2 puffs 2 (two) times a day, Disp: 1 Inhaler, Rfl: 5    levothyroxine 75 mcg tablet, Take 1 tablet (75 mcg total) by mouth daily, Disp: 90 tablet, Rfl: 0    predniSONE 20 mg tablet, Take 2 tablets (40 mg total) by mouth daily for 5 days, Disp: 10 tablet, Rfl: 0    VENTOLIN  (90 Base) MCG/ACT inhaler, Inhale 2 puffs, Disp: , Rfl: 0  No Known Allergies    OBJECTIVE    Vitals:   Vitals:    11/09/18 1456   BP: 116/70   Pulse: 76   Resp: 16   Temp: (!) 97 °F (36 1 °C)   Weight: 77 9 kg (171 lb 12 8 oz)   Height: 5' 2 5" (1 588 m)           Physical Exam   Constitutional: She is oriented to person, place, and time  She appears well-developed and well-nourished  No distress  Neck: Normal range of motion  Neck supple  Cardiovascular: Normal rate, regular rhythm, normal heart sounds and intact distal pulses  No murmur heard  Pulmonary/Chest: Effort normal and breath sounds normal  No respiratory distress  She has no wheezes  She has no rales  Neurological: She is alert and oriented to person, place, and time  She exhibits normal muscle tone  Skin: Skin is warm  She is not diaphoretic  Psychiatric: She has a normal mood and affect  Her behavior is normal    Vitals reviewed           Flower Humphries DO, PGY-2  [facility]   11/9/2018

## 2018-11-09 NOTE — ASSESSMENT & PLAN NOTE
- Denies any SI/HI  - Has had difficulty contacting psychotherapy offices/making appointment  - Will reach out to Yariel Kay our  to assist her

## 2018-11-13 ENCOUNTER — PATIENT OUTREACH (OUTPATIENT)
Dept: FAMILY MEDICINE CLINIC | Facility: CLINIC | Age: 28
End: 2018-11-13

## 2018-11-13 NOTE — PROGRESS NOTES
Received request to contact patient to provide support and information regarding in network outpatient mental health providers  Call to patient regarding same  She reports that she believes her [employer] is Primary, Medicare secondary and Medical Assistance is third  Reviewed options which are in network with her [employer] and contact information for these agencies left on her voicemail per her request   She denies further needs at this time  Will continue to be available to provide support

## 2018-11-28 ENCOUNTER — OFFICE VISIT (OUTPATIENT)
Dept: FAMILY MEDICINE CLINIC | Facility: CLINIC | Age: 28
End: 2018-11-28
Payer: MEDICARE

## 2018-11-28 VITALS
SYSTOLIC BLOOD PRESSURE: 120 MMHG | DIASTOLIC BLOOD PRESSURE: 80 MMHG | HEIGHT: 63 IN | RESPIRATION RATE: 16 BRPM | WEIGHT: 173.6 LBS | HEART RATE: 82 BPM | BODY MASS INDEX: 30.76 KG/M2 | TEMPERATURE: 95.9 F

## 2018-11-28 DIAGNOSIS — R11.2 INTRACTABLE VOMITING WITH NAUSEA, UNSPECIFIED VOMITING TYPE: ICD-10-CM

## 2018-11-28 DIAGNOSIS — E03.9 HYPOTHYROIDISM, UNSPECIFIED TYPE: ICD-10-CM

## 2018-11-28 DIAGNOSIS — N92.1 MENORRHAGIA WITH IRREGULAR CYCLE: ICD-10-CM

## 2018-11-28 DIAGNOSIS — E28.2 PCOS (POLYCYSTIC OVARIAN SYNDROME): Primary | ICD-10-CM

## 2018-11-28 DIAGNOSIS — R11.0 NAUSEA: ICD-10-CM

## 2018-11-28 PROCEDURE — 99213 OFFICE O/P EST LOW 20 MIN: CPT | Performed by: INTERNAL MEDICINE

## 2018-11-28 RX ORDER — KETOROLAC TROMETHAMINE 10 MG/1
10 TABLET, FILM COATED ORAL EVERY 6 HOURS PRN
Qty: 20 TABLET | Refills: 0 | Status: SHIPPED | OUTPATIENT
Start: 2018-11-28 | End: 2019-02-22

## 2018-11-28 RX ORDER — ONDANSETRON 4 MG/1
4 TABLET, ORALLY DISINTEGRATING ORAL EVERY 6 HOURS PRN
Qty: 20 TABLET | Refills: 0 | Status: SHIPPED | OUTPATIENT
Start: 2018-11-28 | End: 2019-02-22

## 2018-11-28 NOTE — ASSESSMENT & PLAN NOTE
Significant abdominal pain with menses, suspect normalizing menses with correction of TSH, can use Toradol for pain for up to five days, advised to call office if pain severe or does not improve with Toradol

## 2018-11-28 NOTE — ASSESSMENT & PLAN NOTE
TSH 10 5 in Sept, levothyroxine recently increased by PCP, will reprint lab slip for TSH/T4 recheck approx Dec 20

## 2018-11-28 NOTE — PROGRESS NOTES
[address]    NAME: Ashley Dallas  AGE: 33 y o  SEX: female    DATE OF ENCOUNTER: 11/28/2018    Assessment and Plan     Problem List Items Addressed This Visit        Endocrine    PCOS (polycystic ovarian syndrome) - Primary    Relevant Medications    ketorolac (TORADOL) 10 mg tablet    Other Relevant Orders    CBC and differential    Hypothyroid     TSH 10 5 in Sept, levothyroxine recently increased by PCP, will reprint lab slip for TSH/T4 recheck approx Dec 20         Relevant Orders    TSH, 3rd generation with T4 reflex       Other    Menorrhagia with irregular cycle     Significant abdominal pain with menses, suspect normalizing menses with correction of TSH, can use Toradol for pain for up to five days, advised to call office if pain severe or does not improve with Toradol         Nausea     Onset at time of menses, will order Zofran to be used when severe           Relevant Medications    ondansetron (ZOFRAN-ODT) 4 mg disintegrating tablet        Orders Placed This Encounter   Procedures    TSH, 3rd generation with T4 reflex    CBC and differential     - Counseling Documentation: patient was counseled regarding: diagnostic results, instructions for management, risk factor reductions, prognosis, patient and family education, impressions, risks and benefits of treatment options and importance of compliance with treatment    Chief Complaint     Chief Complaint   Patient presents with    Abdominal Cramping     Pt has ovarina cyst and is having pain, Pt has not had perios in 6-7 months and now has it  History of Present Illness     Mikhail Ann is here today for evaluation of abdominal pain which started earlier this week when she started her menses after not having it for years due likely to PCOS and uncontrolled hypothyroidism which are both now being treated  She reports that she is having cramps and bloating with nausea   She has been home from work due to the severity of the cramps  She denies change in bowel habits or other complaints  Patient does wish for fertility in future, plans to follow with PCP for discussion of options  Abdominal Cramping   This is a new problem  The current episode started in the past 7 days  The onset quality is sudden  The problem occurs daily  The problem has been waxing and waning  The pain is located in the generalized abdominal region  The pain is moderate  The quality of the pain is cramping  The abdominal pain does not radiate  Associated symptoms include nausea  Pertinent negatives include no anorexia, constipation, diarrhea, fever, headaches, myalgias or vomiting  Nothing aggravates the pain  The pain is relieved by nothing  She has tried nothing for the symptoms  The following portions of the patient's history were reviewed and updated as appropriate: allergies, current medications, past family history, past medical history, past social history, past surgical history and problem list     Review of Systems     Review of Systems   Constitutional: Positive for appetite change  Negative for chills, fever and unexpected weight change  HENT: Negative  Eyes: Negative  Respiratory: Negative  Cardiovascular: Negative  Gastrointestinal: Positive for abdominal pain and nausea  Negative for anorexia, constipation, diarrhea and vomiting  Genitourinary: Negative  Musculoskeletal: Negative for myalgias  Skin: Negative  Neurological: Negative  Negative for headaches  Psychiatric/Behavioral: Negative  All other systems reviewed and are negative        Active Problem List     Patient Active Problem List   Diagnosis    Depression    [facility] ([facility] [address] )    Self-inflicted laceration of wrist, subsequent encounter    Hypothyroid    Mild intermittent asthma with acute exacerbation    PCOS (polycystic ovarian syndrome)    Impaired fasting glucose    Bronchitis    Mild intermittent asthma with exacerbation    Infertility, female    Menorrhagia with irregular cycle    Intractable nausea and vomiting       Objective     /80   Pulse 82   Temp (!) 95 9 °F (35 5 °C)   Resp 16   Ht 5' 2 5" (1 588 m)   Wt 78 7 kg (173 lb 9 6 oz)   LMP 11/24/2018   BMI 31 25 kg/m²     Physical Exam   Constitutional: She is oriented to person, place, and time  She appears well-developed and well-nourished  No distress  HENT:   Head: Normocephalic and atraumatic  Mouth/Throat: No oropharyngeal exudate  Eyes: Pupils are equal, round, and reactive to light  Conjunctivae are normal    Neck: Normal range of motion  Neck supple  No JVD present  No tracheal deviation present  No thyromegaly present  Cardiovascular: Normal rate, regular rhythm and intact distal pulses  Exam reveals no gallop and no friction rub  No murmur heard  Pulmonary/Chest: Effort normal and breath sounds normal  No respiratory distress  She has no wheezes  Abdominal: Soft  Bowel sounds are normal  There is tenderness  There is no rebound and no guarding  Musculoskeletal: Normal range of motion  Neurological: She is alert and oriented to person, place, and time  Skin: Skin is warm and dry  She is not diaphoretic  Psychiatric: She has a normal mood and affect  Nursing note and vitals reviewed      Pertinent Laboratory/Diagnostic Studies:    Current Medications     Current Outpatient Prescriptions:     fluticasone (FLOVENT HFA) 110 MCG/ACT inhaler, Inhale 2 puffs 2 (two) times a day, Disp: 1 Inhaler, Rfl: 5    ketorolac (TORADOL) 10 mg tablet, Take 1 tablet (10 mg total) by mouth every 6 (six) hours as needed for moderate pain, Disp: 20 tablet, Rfl: 0    levothyroxine 75 mcg tablet, Take 1 tablet (75 mcg total) by mouth daily, Disp: 90 tablet, Rfl: 0    ondansetron (ZOFRAN-ODT) 4 mg disintegrating tablet, Take 1 tablet (4 mg total) by mouth every 6 (six) hours as needed for nausea or vomiting, Disp: 20 tablet, Rfl: 0   VENTOLIN  (90 Base) MCG/ACT inhaler, Inhale 2 puffs, Disp: , Rfl: 0    Health Maintenance     Health Maintenance   Topic Date Due    Pneumococcal PPSV23 Medium Risk Adult (1 of 1 - PPSV23) 08/17/2008    INFLUENZA VACCINE  07/01/2018    DTaP,Tdap,and Td Vaccines (7 - Td) 02/05/2028     Immunization History   Administered Date(s) Administered    DTP 06/20/1990, 08/15/1990, 11/23/1992    HPV Quadrivalent 09/17/2010    Hep B, Adolescent or Pediatric 03/15/1999, 09/20/2000, 12/06/2000    Hep B, adult 09/17/2010    MMR 11/23/1992, 08/18/1993    OPV 06/20/1990, 08/15/1990, 11/23/1992, 08/18/1993    Td (adult), adsorbed 09/20/2000    Tdap 09/09/2009, 02/05/2018     Tamika GARCIA    Geriatrics Fellow   11/28/2018 12:29 PM

## 2018-11-28 NOTE — PATIENT INSTRUCTIONS
Please take the Toradol as prescribed for abdominal cramping  Please call tomorrow if your pain does not improve

## 2019-01-07 ENCOUNTER — APPOINTMENT (OUTPATIENT)
Dept: LAB | Facility: HOSPITAL | Age: 29
End: 2019-01-07
Payer: MEDICARE

## 2019-01-07 DIAGNOSIS — E03.9 HYPOTHYROIDISM, UNSPECIFIED TYPE: ICD-10-CM

## 2019-01-07 DIAGNOSIS — E28.2 PCOS (POLYCYSTIC OVARIAN SYNDROME): ICD-10-CM

## 2019-01-07 LAB
BASOPHILS # BLD AUTO: 0.02 THOUSANDS/ΜL (ref 0–0.1)
BASOPHILS NFR BLD AUTO: 0 % (ref 0–1)
EOSINOPHIL # BLD AUTO: 0.23 THOUSAND/ΜL (ref 0–0.61)
EOSINOPHIL NFR BLD AUTO: 5 % (ref 0–6)
ERYTHROCYTE [DISTWIDTH] IN BLOOD BY AUTOMATED COUNT: 12.2 % (ref 11.6–15.1)
HCT VFR BLD AUTO: 41 % (ref 34.8–46.1)
HGB BLD-MCNC: 13.4 G/DL (ref 11.5–15.4)
IMM GRANULOCYTES # BLD AUTO: 0.01 THOUSAND/UL (ref 0–0.2)
IMM GRANULOCYTES NFR BLD AUTO: 0 % (ref 0–2)
LYMPHOCYTES # BLD AUTO: 2.16 THOUSANDS/ΜL (ref 0.6–4.47)
LYMPHOCYTES NFR BLD AUTO: 44 % (ref 14–44)
MCH RBC QN AUTO: 32.1 PG (ref 26.8–34.3)
MCHC RBC AUTO-ENTMCNC: 32.7 G/DL (ref 31.4–37.4)
MCV RBC AUTO: 98 FL (ref 82–98)
MONOCYTES # BLD AUTO: 0.3 THOUSAND/ΜL (ref 0.17–1.22)
MONOCYTES NFR BLD AUTO: 6 % (ref 4–12)
NEUTROPHILS # BLD AUTO: 2.21 THOUSANDS/ΜL (ref 1.85–7.62)
NEUTS SEG NFR BLD AUTO: 45 % (ref 43–75)
NRBC BLD AUTO-RTO: 0 /100 WBCS
PLATELET # BLD AUTO: 318 THOUSANDS/UL (ref 149–390)
PMV BLD AUTO: 9.7 FL (ref 8.9–12.7)
RBC # BLD AUTO: 4.18 MILLION/UL (ref 3.81–5.12)
TSH SERPL DL<=0.05 MIU/L-ACNC: 2.37 UIU/ML (ref 0.36–3.74)
WBC # BLD AUTO: 4.93 THOUSAND/UL (ref 4.31–10.16)

## 2019-01-07 PROCEDURE — 84443 ASSAY THYROID STIM HORMONE: CPT

## 2019-01-07 PROCEDURE — 85025 COMPLETE CBC W/AUTO DIFF WBC: CPT

## 2019-01-07 PROCEDURE — 36415 COLL VENOUS BLD VENIPUNCTURE: CPT

## 2019-01-09 ENCOUNTER — OFFICE VISIT (OUTPATIENT)
Dept: OBGYN CLINIC | Facility: CLINIC | Age: 29
End: 2019-01-09

## 2019-01-09 VITALS
HEART RATE: 79 BPM | HEIGHT: 62 IN | DIASTOLIC BLOOD PRESSURE: 66 MMHG | WEIGHT: 172 LBS | SYSTOLIC BLOOD PRESSURE: 122 MMHG | BODY MASS INDEX: 31.65 KG/M2

## 2019-01-09 DIAGNOSIS — Z31.69 ENCOUNTER FOR PRECONCEPTION CONSULTATION: ICD-10-CM

## 2019-01-09 DIAGNOSIS — N97.9 INFERTILITY, FEMALE: Primary | ICD-10-CM

## 2019-01-09 DIAGNOSIS — Z72.51 HIGH RISK HETEROSEXUAL BEHAVIOR: ICD-10-CM

## 2019-01-09 DIAGNOSIS — Z11.3 SCREEN FOR SEXUALLY TRANSMITTED DISEASES: ICD-10-CM

## 2019-01-09 LAB — SL AMB POCT URINE HCG: NORMAL

## 2019-01-09 PROCEDURE — 99213 OFFICE O/P EST LOW 20 MIN: CPT | Performed by: OBSTETRICS & GYNECOLOGY

## 2019-01-09 RX ORDER — SWAB
1 SWAB, NON-MEDICATED MISCELLANEOUS DAILY
Qty: 30 EACH | Refills: 0 | Status: SHIPPED | OUTPATIENT
Start: 2019-01-09 | End: 2019-02-22

## 2019-01-09 NOTE — PROGRESS NOTES
ASSESSMENT  33 y o  G0 sexually active female w/LMP 18 p/w infertility of 6 years of duration likely secondary to hypothyroidism and/or PCOS  PLAN  1  STI screening: GC/CT collected  2  Prenatal vitamin: Rx sent  3  r/o structural anomaly; re-evaluate for findings on ovary c/w PCOS in : pelvic u/s  4  Laboratory evaluation:   Endocrine screen:     day 3 FSH, day 3 Estradiol, day 21 Progesterone,     TSH 2 370 on 19              Screen for premature ovarian failure: day 3 FSH (see above)   Metabolic screen: [id number] wnl recent    Semen analysis: defer    5  Suspected PCOS    RTC for further PCOS workup (re: recent h/o oligomenorrhea, previous PCO on u/s in )   Weight loss counseling:    Regular exercise     10% weight loss  6  Sexual best practice: increase intercourse to once q 2 days  7  Pap NILM in 2015: RTC for annual w/Pap  8  Medications: reviewed for teratogenecity  9   Hypothyroidism: synthroid 75 mcg       SUBJECTIVE    Menstrual history:    LMP: 18   Cycle length: 30 days    Menses length: 7 days of moderate bleeding   Regular cycles: admits   Skipped periods: admits; "wasn't getting my period at all at the beginning of last year"   Intermenstrual bleeding: denies   Menarche age: 4   Menarche onset: spontaneous     Sexual history:   # of partners in last 61 days: 1   # of partners in last 13 mos: 3   Sex of partners in last 13 mos: male   hx of GC/CT/HSV/HBV/HIV: denies   hx of PID: denies   Condom use: intermittently    Obstetric history:   ever pregnancy: denies; : 0    Partner history:   Duration of sexual relationship with current partner: 6 years   Partner ever impregnated a woman: admits; he just had a partner who she went on a 6 month break with and he had a kid with another lady with a positive paternity test ; he has a total of 4 kids   Partner ever supplemented w/testosterone: denies   Partner ever pelvic surgery: denies   Partner ever testicular trauma: denies   Partner chronic medical conditions: denies    Infertility history:   Frequency of sexual intercourse: 3x/week   Duration of sexual relationship with current partner: 6 yrs   Ever trial of clomid: denies   Ever trial of femara (letrozole): denies   Ever hysterosalpingogram or chromopertubation: denies   Molimina sx:     Midcycle pain: denies    Bloating pain: denies    Breast tenderness: denies   Family hx of infertility: denies   Occupation: clean houses; 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; avoids chlorox    CNS symptoms:   Headache: denies   Seizure: denies   Vomiting: denies   Temporal visual field defect: denies   Recent car accident: denies    Ovarian insufficiency:   hot flashes: denies   vaginal dryness: denies   poor sleep: denies   decreased libido: denies    Endocrine disorder screen:    HyperPRL:   new medications: denies   galactorrhea: denies      PCOS:   acne: denies   hirsutism: denies   change in voice: denies   h/o diabetes mellitus: denies     h/o hypothyroidism: admits    Endometriosis screen:   h/o cyclic pelvic pain: denies   h/o dyspareunia: denies   h/o pain with defecation: denies    Stressors:    recent major life events (ie  divorce, death): admits; 2 deaths, mini-breakup   domestic violence: denies   recent weight change: admits 147 in 2/18 -> 172 today over the trailing 4 mos   eating disorder: denies   number of reported cavities at last dental visit: 2; before none   excessive exercise: just started this week; mild    Congenital Disorder:    h/o birth defect: denies   cognitive delay: denies   short stature: denies    PMH:   new systemic illness: denies   h/o chemoradiation: denies   recent history of major trauma: denies    Past Medical History:   Diagnosis Date    Asthma     Disease of thyroid gland     Kidney stones          PSH:   history of uterine instrumentation: denies   history of ablation: denies   history of hysterectomy: denies   history of LEEP: denies    History reviewed  No pertinent surgical history  Past Social History:    Social History     Social History    Marital status: Single     Spouse name: N/A    Number of children: N/A    Years of education: N/A     Social History Main Topics    Smoking status: Never Smoker    Smokeless tobacco: Never Used      Comment: FORMER SMOKER AS PER ALL SCRIPTS- QUIT IN 2012    Alcohol use Yes      Comment: SOCIAL     Drug use: No      Comment: HISTORY OF USING MARIJUANA: DAILY USE- AS PER ALL SCRIPTS     Sexual activity: Yes     Partners: Male     Other Topics Concern    None     Social History Narrative    None       OBJECTIVE    Vitals:    01/09/19 1423   BP: 122/66   Pulse: 79       Body mass index is 31 46 kg/m²      Lab Results   Component Value Date    [id number] 2 370 01/07/2019       Lab Results   Component Value Date    HGBA1C 5 4 01/16/2018         Physical exam:    Neuro:    Temporal visual fields intact: yes    Breast:   R breast:    Nipple discharge: no   L breast:     Nipple discharge: no    Dental:   Visualized fillings or caries: yes    Thyroid:   Goiter: no   Asymmetry: no   Nodules: no    Cushing's screen - presence of:   moon facies: no   dorsocervical fat pad: no   abdominal striae: no   centripetal fat distribution: no   HTN: no      Hyperandrogenism:   hirsutim - Ferriman-Gifford score: 6   acne:     face: no    chest: no    back: no   androgenic alopecia: no   clitoromegaly: no    Insulin resistance:   acanthosis nigricans:    velvety, mossy, verrucous, hyperpigmented plaques      knuckles: no      nuchal: no      axillary: no      antecubital: yes; L>R      umbilicus: no           acrochordons      knuckles: no      nuchal: no      axillary: yes - single one on L      chest: scattered on R upper chest      inframammary: not assessed      Antecubital: no      inguinal skin folds: not assessed      umbilicus: no        Abdomen:    Rebound: no   Guarding: no   RLQ tenderness to palpation: no   RUQ tenderness to palpation: no   LLQ tenderness to palpation: no   LUQ tenderness to palpation: no    Genitourinary:      Inspection:     Labia minora: Atrophy: no     Erythema: no     Exudate: no     Discharge: no     Warts: no     Rash: no     Speculum exam:     Vagina:     Vaginal mucopurulent discharge: no     Warts: no     Blood in posterior vault: no     Purulent fluid in posterior vault: no     Smoothening of rugae present: no    Cervix:      Endocervical purulent discharge: no     Ectocervical friability: no     Ectocervical lesions or petichiae: no     Bleeding from the os: no     Cervical mucus seen: yes     Pelvic exam:    Suprapubic tenderness to palpation: no    Cervical motion tenderness: no    Uterus:      fundus tender to palpation: no     position: anteverted      mobility: mobile      size: unremarkable      contour: smooth      Adnexae    L:     Size: unremarkable      Tender to palpation: no     Palpable mass(es): no    R:     Size: unremarkable      Tender to palpation: no     Palpable mass(es): no        Normal LxWxT: 3 5 x 2 x 1 5 cm      1+1+0+1+2+0+1+0+0=6      Pelvic u/s in 2011: Findings in the ovaries consistent with history of polycystic ovarian   syndrome

## 2019-01-09 NOTE — PROGRESS NOTES
Spoke with pt regarding GC-CT testing not covered by her insurance  Pt states she will get them at a free clinic and bring results to us

## 2019-01-14 ENCOUNTER — OFFICE VISIT (OUTPATIENT)
Dept: FAMILY MEDICINE CLINIC | Facility: CLINIC | Age: 29
End: 2019-01-14

## 2019-01-14 ENCOUNTER — HOSPITAL ENCOUNTER (OUTPATIENT)
Dept: RADIOLOGY | Facility: HOSPITAL | Age: 29
Discharge: HOME | End: 2019-01-14

## 2019-01-14 ENCOUNTER — TRANSCRIBE ORDERS (OUTPATIENT)
Dept: RADIOLOGY | Facility: HOSPITAL | Age: 29
End: 2019-01-14

## 2019-01-14 VITALS
HEART RATE: 88 BPM | RESPIRATION RATE: 16 BRPM | HEIGHT: 62 IN | TEMPERATURE: 97.7 F | BODY MASS INDEX: 31.62 KG/M2 | DIASTOLIC BLOOD PRESSURE: 80 MMHG | SYSTOLIC BLOOD PRESSURE: 122 MMHG | WEIGHT: 171.8 LBS

## 2019-01-14 DIAGNOSIS — N97.9 INFERTILITY, FEMALE: ICD-10-CM

## 2019-01-14 DIAGNOSIS — E03.9 HYPOTHYROIDISM, UNSPECIFIED TYPE: Primary | ICD-10-CM

## 2019-01-14 PROCEDURE — 99213 OFFICE O/P EST LOW 20 MIN: CPT | Performed by: FAMILY MEDICINE

## 2019-01-14 RX ORDER — LEVOTHYROXINE SODIUM 0.07 MG/1
75 TABLET ORAL DAILY
Qty: 90 TABLET | Refills: 1 | Status: SHIPPED | OUTPATIENT
Start: 2019-01-14 | End: 2019-07-24 | Stop reason: SDUPTHER

## 2019-01-14 NOTE — ASSESSMENT & PLAN NOTE
Patient here for follow-up of her lab result, TSH on 01/07/2019, 2 370, continue levothyroxine 75 mcg daily, follow-up in 3 months

## 2019-01-14 NOTE — PROGRESS NOTES
Assessment/Plan     Hypothyroid  Patient here for follow-up of her lab result, TSH on 01/07/2019, 2 370, continue levothyroxine 75 mcg daily, follow-up in 3 months    Diagnoses and all orders for this visit:    Hypothyroidism, unspecified type  -     levothyroxine 75 mcg tablet; Take 1 tablet (75 mcg total) by mouth daily         Subjective     Chief Complaint   Patient presents with    Follow-up       29-year-old female presented to office for review of lab work  She has hypothyroidism and had TSH done, she takes 75 mcg daily, reports no side effects        The following portions of the patient's history were reviewed and updated as appropriate: allergies, current medications, past family history, past medical history, past social history, past surgical history and problem list     Review of Systems   Constitutional: Negative for activity change, chills and fever  HENT: Negative for congestion  Respiratory: Negative for shortness of breath  Cardiovascular: Negative for chest pain  Gastrointestinal: Negative for diarrhea, nausea and vomiting  Genitourinary: Negative for difficulty urinating  Neurological: Negative for headaches  Objective     Vitals:Blood pressure 122/80, pulse 88, temperature 97 7 °F (36 5 °C), resp  rate 16, height 5' 2" (1 575 m), weight 77 9 kg (171 lb 12 8 oz), last menstrual period 12/24/2018  Physical Exam:  Physical Exam   Constitutional: She is oriented to person, place, and time  She appears well-developed and well-nourished  HENT:   Head: Normocephalic and atraumatic  Mouth/Throat: Oropharynx is clear and moist    Eyes: Conjunctivae and EOM are normal    Neck: Normal range of motion  Neck supple  Cardiovascular: Normal rate, regular rhythm and normal heart sounds  Exam reveals no friction rub  No murmur heard  Pulmonary/Chest: Effort normal and breath sounds normal  No respiratory distress  She has no wheezes  She has no rales  Abdominal: Soft   Bowel sounds are normal  She exhibits no distension  There is no tenderness  Musculoskeletal: Normal range of motion  Neurological: She is alert and oriented to person, place, and time  Skin: Skin is warm and dry

## 2019-01-15 ENCOUNTER — HOSPITAL ENCOUNTER (OUTPATIENT)
Dept: RADIOLOGY | Facility: HOSPITAL | Age: 29
Discharge: HOME | End: 2019-01-15
Payer: MEDICARE

## 2019-01-15 DIAGNOSIS — N97.9 INFERTILITY, FEMALE: ICD-10-CM

## 2019-01-15 PROCEDURE — 76856 US EXAM PELVIC COMPLETE: CPT

## 2019-01-15 PROCEDURE — 76830 TRANSVAGINAL US NON-OB: CPT

## 2019-02-18 ENCOUNTER — TELEPHONE (OUTPATIENT)
Dept: FAMILY MEDICINE CLINIC | Facility: CLINIC | Age: 29
End: 2019-02-18

## 2019-02-18 NOTE — TELEPHONE ENCOUNTER
Patient calling about Ultra [facility] but referred to [facility] for continuing care

## 2019-02-22 ENCOUNTER — APPOINTMENT (OUTPATIENT)
Dept: LAB | Facility: HOSPITAL | Age: 29
End: 2019-02-22
Payer: MEDICARE

## 2019-02-22 ENCOUNTER — TELEPHONE (OUTPATIENT)
Dept: OBGYN CLINIC | Facility: CLINIC | Age: 29
End: 2019-02-22

## 2019-02-22 ENCOUNTER — OFFICE VISIT (OUTPATIENT)
Dept: OBGYN CLINIC | Facility: CLINIC | Age: 29
End: 2019-02-22

## 2019-02-22 VITALS — DIASTOLIC BLOOD PRESSURE: 76 MMHG | HEART RATE: 81 BPM | SYSTOLIC BLOOD PRESSURE: 121 MMHG

## 2019-02-22 DIAGNOSIS — E28.2 PCOS (POLYCYSTIC OVARIAN SYNDROME): ICD-10-CM

## 2019-02-22 DIAGNOSIS — R10.30 LOWER ABDOMINAL PAIN: ICD-10-CM

## 2019-02-22 DIAGNOSIS — R10.30 LOWER ABDOMINAL PAIN: Primary | ICD-10-CM

## 2019-02-22 DIAGNOSIS — Z71.2 ENCOUNTER TO DISCUSS TEST RESULTS: ICD-10-CM

## 2019-02-22 LAB
ALBUMIN SERPL BCP-MCNC: 4.4 G/DL (ref 3.5–5)
ALP SERPL-CCNC: 68 U/L (ref 46–116)
ALT SERPL W P-5'-P-CCNC: 40 U/L (ref 12–78)
ANION GAP SERPL CALCULATED.3IONS-SCNC: 7 MMOL/L (ref 4–13)
AST SERPL W P-5'-P-CCNC: 25 U/L (ref 5–45)
BILIRUB SERPL-MCNC: 0.78 MG/DL (ref 0.2–1)
BUN SERPL-MCNC: 15 MG/DL (ref 5–25)
CALCIUM SERPL-MCNC: 9.3 MG/DL (ref 8.3–10.1)
CHLORIDE SERPL-SCNC: 104 MMOL/L (ref 100–108)
CO2 SERPL-SCNC: 27 MMOL/L (ref 21–32)
CREAT SERPL-MCNC: 0.77 MG/DL (ref 0.6–1.3)
GFR SERPL CREATININE-BSD FRML MDRD: 105 ML/MIN/1.73SQ M
GLUCOSE P FAST SERPL-MCNC: 89 MG/DL (ref 65–99)
POTASSIUM SERPL-SCNC: 3.9 MMOL/L (ref 3.5–5.3)
PROT SERPL-MCNC: 8.5 G/DL (ref 6.4–8.2)
SODIUM SERPL-SCNC: 138 MMOL/L (ref 136–145)

## 2019-02-22 PROCEDURE — 36415 COLL VENOUS BLD VENIPUNCTURE: CPT

## 2019-02-22 PROCEDURE — 80053 COMPREHEN METABOLIC PANEL: CPT

## 2019-02-22 PROCEDURE — 99213 OFFICE O/P EST LOW 20 MIN: CPT | Performed by: OBSTETRICS & GYNECOLOGY

## 2019-02-22 RX ORDER — NORGESTIMATE AND ETHINYL ESTRADIOL 0.25-0.035
1 KIT ORAL DAILY
Qty: 28 TABLET | Refills: 3 | Status: SHIPPED | OUTPATIENT
Start: 2019-02-22 | End: 2019-07-25

## 2019-02-25 PROBLEM — Z71.2 ENCOUNTER TO DISCUSS TEST RESULTS: Status: ACTIVE | Noted: 2019-02-25

## 2019-02-25 NOTE — ASSESSMENT & PLAN NOTE
Explained to patient that PCOS cysts are not usually surgically removed  We discussed alternative methods of controlling pain

## 2019-02-25 NOTE — PROGRESS NOTES
Assessment/Plan:    PCOS (polycystic ovarian syndrome)  Pt will try OCPs again to help regulate menses and pain  I discussed with patient that the pain sounds like it is not related to her PCOS and I gave her a script for CT abd/pelvis and CMP to r/o other causes  Encounter to discuss ultrasound results  Explained to patient that PCOS cysts are not usually surgically removed  We discussed alternative methods of controlling pain  Diagnoses and all orders for this visit:    Lower abdominal pain  -     Comprehensive metabolic panel; Future  -     CT abdomen pelvis w wo contrast; Future    PCOS (polycystic ovarian syndrome)  -     norgestimate-ethinyl estradiol (ORTHO-CYCLEN) 0 25-35 MG-MCG per tablet; Take 1 tablet by mouth daily    Encounter to discuss ultrasound results          Subjective:      Patient ID: Linda Akbar is a 33 y o  female  Pt here to discuss ultrasound results which just showed polycistic ovaries consistent with PCOS diagnosis  Pt complaining of abdominal pain that for the past month has been constant and not associated with her periods like before  Pain is 5/10, dull and achy, constant  She tried OTC pain medication with no relief  She was given toradol which did not help either  Patient wants to know if cysts on ovaries can be removed  She is currently not taking any medications  Used to be on birth control but not taking it anymore  The following portions of the patient's history were reviewed and updated as appropriate: allergies, current medications, past family history, past medical history, past social history, past surgical history and problem list     Review of Systems   Gastrointestinal: Positive for abdominal pain  Genitourinary: Positive for menstrual problem  Objective:      /76 (BP Location: Left arm)   Pulse 81   LMP 02/04/2019          Physical Exam   Constitutional: She is oriented to person, place, and time   She appears well-developed and well-nourished  HENT:   Head: Normocephalic and atraumatic  Cardiovascular: Normal rate and regular rhythm  Pulmonary/Chest: Effort normal and breath sounds normal    Abdominal: Soft  Bowel sounds are normal  She exhibits no distension  There is tenderness  There is no guarding  Neurological: She is alert and oriented to person, place, and time  Skin: Skin is warm  Vitals reviewed

## 2019-02-25 NOTE — ASSESSMENT & PLAN NOTE
Pt will try OCPs again to help regulate menses and pain  I discussed with patient that the pain sounds like it is not related to her PCOS and I gave her a script for CT abd/pelvis and CMP to r/o other causes

## 2019-03-05 ENCOUNTER — APPOINTMENT (OUTPATIENT)
Dept: LAB | Facility: HOSPITAL | Age: 29
End: 2019-03-05
Payer: MEDICARE

## 2019-03-05 ENCOUNTER — TRANSCRIBE ORDERS (OUTPATIENT)
Dept: LAB | Facility: HOSPITAL | Age: 29
End: 2019-03-05

## 2019-03-05 DIAGNOSIS — N97.9 INFERTILITY, FEMALE: ICD-10-CM

## 2019-03-05 LAB
ESTRADIOL SERPL-MCNC: 51 PG/ML
FSH SERPL-ACNC: 4.4 MIU/ML

## 2019-03-05 PROCEDURE — 82670 ASSAY OF TOTAL ESTRADIOL: CPT

## 2019-03-05 PROCEDURE — 36415 COLL VENOUS BLD VENIPUNCTURE: CPT

## 2019-03-05 PROCEDURE — 83001 ASSAY OF GONADOTROPIN (FSH): CPT

## 2019-03-23 ENCOUNTER — APPOINTMENT (OUTPATIENT)
Dept: LAB | Facility: HOSPITAL | Age: 29
End: 2019-03-23
Payer: MEDICARE

## 2019-03-23 DIAGNOSIS — N97.9 INFERTILITY, FEMALE: ICD-10-CM

## 2019-03-23 LAB — PROGEST SERPL-MCNC: 1.1 NG/ML

## 2019-03-23 PROCEDURE — 36415 COLL VENOUS BLD VENIPUNCTURE: CPT

## 2019-03-23 PROCEDURE — 84144 ASSAY OF PROGESTERONE: CPT

## 2019-03-28 ENCOUNTER — APPOINTMENT (OUTPATIENT)
Dept: LAB | Facility: HOSPITAL | Age: 29
End: 2019-03-28
Payer: MEDICARE

## 2019-03-28 ENCOUNTER — OFFICE VISIT (OUTPATIENT)
Dept: OBGYN CLINIC | Facility: CLINIC | Age: 29
End: 2019-03-28

## 2019-03-28 ENCOUNTER — TRANSCRIBE ORDERS (OUTPATIENT)
Dept: LAB | Facility: HOSPITAL | Age: 29
End: 2019-03-28

## 2019-03-28 VITALS
SYSTOLIC BLOOD PRESSURE: 126 MMHG | DIASTOLIC BLOOD PRESSURE: 83 MMHG | WEIGHT: 173 LBS | HEART RATE: 72 BPM | BODY MASS INDEX: 31.64 KG/M2 | RESPIRATION RATE: 16 BRPM

## 2019-03-28 DIAGNOSIS — E03.8 OTHER SPECIFIED HYPOTHYROIDISM: ICD-10-CM

## 2019-03-28 DIAGNOSIS — E28.2 PCOS (POLYCYSTIC OVARIAN SYNDROME): Primary | ICD-10-CM

## 2019-03-28 DIAGNOSIS — Z31.69 ENCOUNTER FOR PRECONCEPTION CONSULTATION: ICD-10-CM

## 2019-03-28 DIAGNOSIS — E28.2 PCOS (POLYCYSTIC OVARIAN SYNDROME): ICD-10-CM

## 2019-03-28 LAB
CHOLEST SERPL-MCNC: 170 MG/DL (ref 50–200)
HDLC SERPL-MCNC: 59 MG/DL (ref 40–60)
LDLC SERPL CALC-MCNC: 98 MG/DL (ref 0–100)
NONHDLC SERPL-MCNC: 111 MG/DL
PROLACTIN SERPL-MCNC: 8.3 NG/ML
TRIGL SERPL-MCNC: 63 MG/DL
TSH SERPL DL<=0.05 MIU/L-ACNC: 2.35 UIU/ML (ref 0.36–3.74)

## 2019-03-28 PROCEDURE — 86800 THYROGLOBULIN ANTIBODY: CPT

## 2019-03-28 PROCEDURE — 84443 ASSAY THYROID STIM HORMONE: CPT

## 2019-03-28 PROCEDURE — 84146 ASSAY OF PROLACTIN: CPT

## 2019-03-28 PROCEDURE — 36415 COLL VENOUS BLD VENIPUNCTURE: CPT

## 2019-03-28 PROCEDURE — 86376 MICROSOMAL ANTIBODY EACH: CPT

## 2019-03-28 PROCEDURE — 80061 LIPID PANEL: CPT

## 2019-03-28 PROCEDURE — 99213 OFFICE O/P EST LOW 20 MIN: CPT | Performed by: OBSTETRICS & GYNECOLOGY

## 2019-03-28 RX ORDER — PNV NO.95/FERROUS FUM/FOLIC AC 28MG-0.8MG
1 TABLET ORAL DAILY
Qty: 30 TABLET | Refills: 11 | Status: SHIPPED | OUTPATIENT
Start: 2019-03-28

## 2019-03-28 RX ORDER — LETROZOLE 2.5 MG/1
5 TABLET, FILM COATED ORAL DAILY
Qty: 30 TABLET | Refills: 0 | Status: SHIPPED | OUTPATIENT
Start: 2019-03-28 | End: 2019-07-25 | Stop reason: SDUPTHER

## 2019-03-28 NOTE — PROGRESS NOTES
ASSESSMENT  33 y o  G0 sexually active female w/LMP 3/3/19 and BC:none p/w infertility of 6 years consistent with suspected PCOS  PLAN  RTC in 1 wk for f/u of lab test results    Suspected PCOS ([place] criteria: 1/3)   Imaging: pelvic u/s showed findings suggestive of PCOS 1/5/19   Labs:    2 hr 75 g OGTT    fasting lipids/TAGs    Defer HbA1c    Androgen testing: defer total T, SHBG, Albumin     H/o Hypothyroidism   Cont home synthroid 100 mcg   Anti-thyroid peroxidase Abs ordered   TSH w/FT4 reflex ordered    Lower androgens, regulate cycles, decrease hirsutism, improved pregnancy rates:   10% weight loss   Regular exercise    Ovulation regulation: defer Sprintec OCPs Rx sent for now; consider 3 month course of BC in future to lower androgens    r/o other etiologies for AUB:    screen for premature ovarian failure: Day 3 FSH normal    screen for STDs: collect GC/CT at next visit (pt not undressed)    r/o unlikely etiologies:   r/o hyperprolactinemia: prolactin    r/o adult onset 21-OH deficiency: defer 8 am fasting 17-OH P (only if in ethnic group)   screen for virilizing tumor: defer DHEAS    r/o Cushings (only if multiple co-existing signs): defer 24 hour urinary free-cortisol excretion    Insulin resistance, weight loss, infertility:    Defer metformin 500 XL, titrating up to 1000 bid XL pending 75 gram OGTT test results    Infertility:    Prenatal vitamin Rx sent   Letrozole 5 mg/day for 5 days starting on day 3 ; attempt for 3 cycles   Counseled on increased risk of GHTN/Pre-eclampsia, GDM, multi-fetal gest     Obstructed sleep apnea: not suspected    D/w Dr Abbi Urbina extensively      SUBJECTIVE    Gyn hx:   LMP:     3/3/19     then 2/4/19     then none in January     then 12/24/19     then 11/24/19      Cycle length: irregular   Regular cycles: yes   Days of menses: 7-10; bled until 3/12/19   BC: none    Tobacco use: no  Anabolic steroid use: no  Other medications:   Patient's Medications   New Prescriptions    No medications on file   Previous Medications    FLUTICASONE (FLOVENT HFA) 110 MCG/ACT INHALER    Inhale 2 puffs 2 (two) times a day    LEVOTHYROXINE 75 MCG TABLET    Take 1 tablet (75 mcg total) by mouth daily    NORGESTIMATE-ETHINYL ESTRADIOL (ORTHO-CYCLEN) 0 25-35 MG-MCG PER TABLET    Take 1 tablet by mouth daily    VENTOLIN  (90 BASE) MCG/ACT INHALER    Inhale 2 puffs   Modified Medications    No medications on file   Discontinued Medications    No medications on file     Recent weight gain or weight loss: some - see  Wt Readings from Last 20 Encounters:   03/28/19 78 5 kg (173 lb)   01/14/19 77 9 kg (171 lb 12 8 oz)   01/09/19 78 kg (172 lb)   11/28/18 78 7 kg (173 lb 9 6 oz)   11/09/18 77 9 kg (171 lb 12 8 oz)   11/05/18 76 6 kg (168 lb 12 8 oz)   02/27/18 67 8 kg (149 lb 6 4 oz)   02/14/18 66 7 kg (147 lb)   02/07/18 67 1 kg (148 lb)   02/05/18 71 7 kg (158 lb)   01/10/18 66 7 kg (147 lb 2 1 oz)   08/29/17 74 8 kg (165 lb)   08/23/17 74 8 kg (165 lb)   09/03/15 65 9 kg (145 lb 6 1 oz)   08/25/15 66 8 kg (147 lb 4 oz)   08/19/15 68 kg (150 lb)   06/24/15 66 8 kg (147 lb 4 oz)   06/10/15 67 2 kg (148 lb 2 1 oz)   02/18/15 68 9 kg (152 lb)   12/10/14 70 4 kg (155 lb 2 1 oz)       Has hirsutism been rapid in onset: no   (re: virilizing tumor - order DHEAS)  Ancestry of Ashkenazi Anab[employer]ism, , [place],  [place] in [facility], or [facility]: yes    Previous h/o Depression: no   see PHP-Q9 score below  H/o chronic HTN: No    Parental history of diabetes: no   Mom Age of onset:    Dad Age of onset:   Parental history cardiovascular disease: no   Mom Age of onset:    Dad Age of onset:     Screening for GERI:   daytime sleepiness / low energy: no   do you regularly fall asleep while:    in boring, passive, or monotonous situations: no     reading: no    watching television: no    while operating a motor vehicle: no   How much caffeine do you drink: soda once a day   Repetitive awakenings at night: no   Do you awaken with a sensation of:    choking: no    gasping: no    smothering: no   Does your partner report while sleeping:    loud snoring: no    choking: no    resuscitative gasping: no    resuscitative snorting: no    interruptions in breathing: no    witnessed apneic periods: no    periods of silence followed by loud snoring: no   Bifrontal daily morning headaches w/squeezing quality: no   Lack of concentration: no   Memory impairment: no   Decreased libido and impotence: no   Nocturia (>2/night): no   Moodiness or irritability: no   Dry mouth: no   Sore throat: no   STOP-Bang GERI questionnaire score: 0    OBJECTIVE    Blood pressure:   Vitals:    03/28/19 1049   BP: 126/83   Pulse: 72   Resp: 16     History of elevated BPs: (reviewed in Epic Flowsheets):    Physical exam findings: see previous complete RoS and physical exam in January; pt reports no signs of hirsutism     Obesity:     Body mass index is 31 64 kg/m²

## 2019-03-29 LAB
THYROGLOB AB SERPL-ACNC: <1 IU/ML (ref 0–0.9)
THYROPEROXIDASE AB SERPL-ACNC: 182 IU/ML (ref 0–34)

## 2019-04-04 ENCOUNTER — OFFICE VISIT (OUTPATIENT)
Dept: OBGYN CLINIC | Facility: CLINIC | Age: 29
End: 2019-04-04

## 2019-04-04 VITALS — BODY MASS INDEX: 31.47 KG/M2 | WEIGHT: 171 LBS | HEIGHT: 62 IN

## 2019-04-04 DIAGNOSIS — N97.9 INFERTILITY, FEMALE: Primary | ICD-10-CM

## 2019-04-04 PROCEDURE — 99213 OFFICE O/P EST LOW 20 MIN: CPT | Performed by: OBSTETRICS & GYNECOLOGY

## 2019-07-19 ENCOUNTER — OFFICE VISIT (OUTPATIENT)
Dept: FAMILY MEDICINE CLINIC | Facility: CLINIC | Age: 29
End: 2019-07-19

## 2019-07-19 VITALS
BODY MASS INDEX: 30.33 KG/M2 | WEIGHT: 164.8 LBS | RESPIRATION RATE: 16 BRPM | HEIGHT: 62 IN | DIASTOLIC BLOOD PRESSURE: 70 MMHG | SYSTOLIC BLOOD PRESSURE: 110 MMHG | TEMPERATURE: 98.1 F | HEART RATE: 72 BPM

## 2019-07-19 DIAGNOSIS — E03.9 HYPOTHYROIDISM, UNSPECIFIED TYPE: Primary | ICD-10-CM

## 2019-07-19 DIAGNOSIS — K21.9 GASTROESOPHAGEAL REFLUX DISEASE WITHOUT ESOPHAGITIS: ICD-10-CM

## 2019-07-19 PROBLEM — R11.0 NAUSEA: Status: RESOLVED | Noted: 2018-11-28 | Resolved: 2019-07-19

## 2019-07-19 PROBLEM — J40 BRONCHITIS: Status: RESOLVED | Noted: 2018-11-05 | Resolved: 2019-07-19

## 2019-07-19 PROBLEM — Z71.2 ENCOUNTER TO DISCUSS TEST RESULTS: Status: RESOLVED | Noted: 2019-02-25 | Resolved: 2019-07-19

## 2019-07-19 PROCEDURE — 99213 OFFICE O/P EST LOW 20 MIN: CPT | Performed by: FAMILY MEDICINE

## 2019-07-19 RX ORDER — RANITIDINE 150 MG/1
150 CAPSULE ORAL 2 TIMES DAILY
Qty: 60 CAPSULE | Refills: 1 | Status: SHIPPED | OUTPATIENT
Start: 2019-07-19

## 2019-07-19 NOTE — ASSESSMENT & PLAN NOTE
Patient is taking Levothyroxine 75 mcg oral daily  No active complaints right now  Will continue her levothyroxine  Advice to come for follow-up in 6 months  Repeat her TSH level in 6 months

## 2019-07-23 NOTE — PROGRESS NOTES
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  Suspect ovulatory etiology due to PCOS  Unlikely uterine factor due to normal ultrasound, unlikely male factor given that her partner has fathered multiple children (last < 1 year ago); tubal factor rule out pending  Status post 3 months of Letrozole 5mg x 2 months -->7 5mg x 1 month Days 3-5  - Day 21 progesterone today  - Rx Letrozole 7 5mg Days 3-7 x 3 months  - Rx HSG, if uncovered by insurance can consider in office SIS as alternative  - Although her partner has fathered multiple children (last one < 1 year ago), consider completion of semen analysis in the future        Discussed in detail with Dr Dayna Deluna     Diagnoses and all orders for this visit:    Female infertility  -     Progesterone; Future  -     FL hysterosalpingogram; Future  -     HEMOGLOBIN A1C W/ EAG ESTIMATION; Future  -     POCT urine HCG    Infertility, female         Subjective:      Patient ID: Kristie Syed is a 33 y o  female  HPI  Kristie Syed is a 33year old [de-identified] female with a history of primary infertility x 6 years who presents for infertility follow up  She reports that for the past 2 years she has actively been trying to conceive, with ovulatory kits and timed intercourse  She was last evaluated on April 4, 2019 by Dr Gato Carreon and prescribe the letrozole 5 mg a day for 5 days starting on day 3 from his recent cycle with a plan to attempt for 3 cycles and to follow in clinic  Etiology of her infertility is suspected to be an ovulatory given suspected PCOS  Imaging Pelvic US: showed findings suggestive of PCOS 1/5/19  Labs: 2hr 75g OGTT not completed; A1C ordered today   Lipid panel WNL  Androgen testing: deferred  Prolactin 8 3  TSH 2 350    In April she took 5mg Letrozole on day 3-5, again in May; in June increased to 7 5mg  LMP 7/3 and again on 7/21 this month  UPT negative today       Review of Systems   Constitutional: Negative for chills and fever  Gastrointestinal: Positive for diarrhea, nausea and vomiting  Negative for abdominal pain  Genitourinary: Negative for dysuria, flank pain, vaginal bleeding, vaginal discharge and vaginal pain  Objective:    /75 (BP Location: Left arm, Patient Position: Sitting, Cuff Size: Adult)   Pulse 78   Wt 75 3 kg (166 lb)   LMP 07/21/2019 (LMP Unknown)   BMI 30 36 kg/m²        Physical Exam   Constitutional: She is oriented to person, place, and time  She appears well-developed and well-nourished  No distress  HENT:   Head: Normocephalic and atraumatic  Cardiovascular: Normal rate, regular rhythm and normal heart sounds  Pulmonary/Chest: Effort normal and breath sounds normal    Abdominal: Soft  There is no tenderness  There is no guarding  Musculoskeletal: She exhibits no edema or tenderness  Neurological: She is alert and oriented to person, place, and time  Skin: Skin is warm and dry  She is not diaphoretic  Psychiatric: She has a normal mood and affect   Her behavior is normal

## 2019-07-24 DIAGNOSIS — E03.9 HYPOTHYROIDISM, UNSPECIFIED TYPE: ICD-10-CM

## 2019-07-24 RX ORDER — LEVOTHYROXINE SODIUM 0.07 MG/1
75 TABLET ORAL DAILY
Qty: 90 TABLET | Refills: 1 | Status: SHIPPED | OUTPATIENT
Start: 2019-07-24 | End: 2019-11-25 | Stop reason: SDUPTHER

## 2019-07-25 ENCOUNTER — OFFICE VISIT (OUTPATIENT)
Dept: OBGYN CLINIC | Facility: CLINIC | Age: 29
End: 2019-07-25

## 2019-07-25 ENCOUNTER — APPOINTMENT (OUTPATIENT)
Dept: LAB | Facility: HOSPITAL | Age: 29
End: 2019-07-25
Payer: MEDICARE

## 2019-07-25 VITALS
BODY MASS INDEX: 30.36 KG/M2 | SYSTOLIC BLOOD PRESSURE: 119 MMHG | WEIGHT: 166 LBS | DIASTOLIC BLOOD PRESSURE: 75 MMHG | HEART RATE: 78 BPM

## 2019-07-25 DIAGNOSIS — E03.9 HYPOTHYROIDISM, UNSPECIFIED TYPE: ICD-10-CM

## 2019-07-25 DIAGNOSIS — N97.9 INFERTILITY, FEMALE: ICD-10-CM

## 2019-07-25 DIAGNOSIS — N97.9 FEMALE INFERTILITY: ICD-10-CM

## 2019-07-25 DIAGNOSIS — E28.2 PCOS (POLYCYSTIC OVARIAN SYNDROME): ICD-10-CM

## 2019-07-25 DIAGNOSIS — N97.9 FEMALE INFERTILITY: Primary | ICD-10-CM

## 2019-07-25 LAB
EST. AVERAGE GLUCOSE BLD GHB EST-MCNC: 100 MG/DL
HBA1C MFR BLD: 5.1 % (ref 4.2–6.3)
PROGEST SERPL-MCNC: 1.1 NG/ML
SL AMB POCT URINE HCG: NEGATIVE
TSH SERPL DL<=0.05 MIU/L-ACNC: 3.34 UIU/ML (ref 0.36–3.74)

## 2019-07-25 PROCEDURE — 84443 ASSAY THYROID STIM HORMONE: CPT

## 2019-07-25 PROCEDURE — 36415 COLL VENOUS BLD VENIPUNCTURE: CPT

## 2019-07-25 PROCEDURE — 84144 ASSAY OF PROGESTERONE: CPT

## 2019-07-25 PROCEDURE — 83036 HEMOGLOBIN GLYCOSYLATED A1C: CPT

## 2019-07-25 PROCEDURE — 81025 URINE PREGNANCY TEST: CPT | Performed by: OBSTETRICS & GYNECOLOGY

## 2019-07-25 PROCEDURE — 99213 OFFICE O/P EST LOW 20 MIN: CPT | Performed by: OBSTETRICS & GYNECOLOGY

## 2019-07-25 RX ORDER — LETROZOLE 2.5 MG/1
7.5 TABLET, FILM COATED ORAL DAILY
Qty: 15 TABLET | Refills: 3 | Status: SHIPPED | OUTPATIENT
Start: 2019-07-25

## 2019-07-25 NOTE — PATIENT INSTRUCTIONS
HSG = hysterosalpingogram *    Letrozole (By mouth)   Letrozole (LET-avila-zole)  Treats breast cancer  Brand Name(s): Femara   There may be other brand names for this medicine  When This Medicine Should Not Be Used: You should not use this medicine if you are pregnant, planning to become pregnant, or if you have not stopped menstruating (premenopausal)  How to Use This Medicine:   Tablet  · Medicines used to treat cancer are very strong and can have many side effects  Before receiving this medicine, make sure you understand all the risks and benefits  It is important for you to work closely with your doctor during your treatment  · Your doctor will tell you how much medicine to use  Do not use more than directed  · You may take this medicine with or without food  If a dose is missed:   · This medicine needs to be given on a fixed schedule  If you miss a dose, call your doctor for instructions  · If you vomit after taking your medicine, call your doctor or pharmacist for instructions  How to Store and Dispose of This Medicine:   · Store the medicine in a closed container at room temperature, away from heat, moisture, and direct light  · Ask your pharmacist, doctor, or health caregiver about the best way to dispose of any leftover medicine after you have finished your treatment  You will also need to throw away old medicine after the expiration date has passed  · Keep all medicine out of the reach of children  Never share your medicine with anyone  Drugs and Foods to Avoid:   Ask your doctor or pharmacist before using any other medicine, including over-the-counter medicines, vitamins, and herbal products  · Make sure your doctor knows if you are also using tamoxifen (Nolvadex®)  Warnings While Using This Medicine:   · It is not safe to take this medicine during pregnancy  It could harm an unborn baby  Tell your doctor right away if you become pregnant    · Make sure your doctor knows if you are breastfeeding, or if you have severe liver disease or cirrhosis, bone problems (such as osteoporosis), or high cholesterol in the blood  · This medicine may decrease bone mineral density when used for a long time  A low bone mineral density can cause weak bones or osteoporosis  If you have any questions about this, talk to your doctor  · This medicine may increase your cholesterol or fat in the blood  If this happens, your doctor may give you medicine to lower the cholesterol and fat  · This medicine may make you dizzy or drowsy  Avoid driving, using machines, or doing anything else that could be dangerous if you are not alert  · Your doctor will check your progress and the effects of this medicine at regular visits  Keep all appointments  Blood tests may be needed to check for unwanted effects  Possible Side Effects While Using This Medicine:   Call your doctor right away if you notice any of these side effects:  · Allergic reaction: Itching or hives, swelling in your face or hands, swelling or tingling in your mouth or throat, chest tightness, trouble breathing  · Changes in vision  · Chest pain, shortness of breath, or coughing up blood  · Fast, irregular, or pounding heartbeat  · Fever, chills, cough, sore throat, and body aches  · Increase in how much or how often you urinate, painful urination  · Numbness or weakness in your arm or leg, or on one side of your body  · Pain in your lower leg (calf)  · Severe bone, joint, or back pain  · Sudden or severe headache, problems with vision, speech, or walking  · Swelling in your ankles or feet  · Unusual bleeding or bruising  · Unusual tiredness or weakness  · Vaginal discharge, bleeding, or dryness  If you notice these less serious side effects, talk with your doctor:   · Breast pain  · Changes in appetite  · Diarrhea, constipation, nausea, vomiting, or stomach pain  · Dizziness  · Hot flashes or increased sweating  · Mild headache    · Mild joint, back, or muscle pain  · Skin rash  · Trouble sleeping  · Warmth or redness in your face, neck, arms, or upper chest   · Weight gain or loss  If you notice other side effects that you think are caused by this medicine, tell your doctor  Call your doctor for medical advice about side effects  You may report side effects to FDA at 2-854-FDA-3425  © 2017 [employer] Information is for End User's use only and may not be sold, redistributed or otherwise used for commercial purposes  The above information is an  only  It is not intended as medical advice for individual conditions or treatments  Talk to your doctor, nurse or pharmacist before following any medical regimen to see if it is safe and effective for you  PLEASE TAKE LETROZOLE 7 5MG ON DAYS 3-7 OF YOUR CYCLE X 3 MONTHS  Hysterosalpingography   WHAT YOU NEED TO KNOW:   Hysterosalpingography (HSG) is a procedure to look inside your uterus and fallopian tubes  WHILE YOU ARE HERE:   Before your procedure:   · Informed consent  is a legal document that explains the tests, treatments, or procedures that you may need  Informed consent means you understand what will be done and can make decisions about what you want  You give your permission when you sign the consent form  You can have someone sign this form for you if you are not able to sign it  You have the right to understand your medical care in words you know  Before you sign the consent form, understand the risks and benefits of what will be done  Make sure all your questions are answered  · An IV  is a small tube placed in your vein that is used to give you medicine or liquids  During your procedure: You will lie on a table  Your legs will be put up in stirrups  Your healthcare provider will insert a gloved hand to check your vagina and cervix  He will gently put in a warmed speculum to widen and hold open your vagina   A catheter will then be inserted into your cervix  Once the catheter is inserted, the speculum may be removed  Your healthcare provider will then inject a dye through the catheter and several x-ray pictures will be taken  After the x-rays are taken, the catheter will be removed  You will need to wear a sanitary pad to absorb blood or dye that drains after the procedure  After your procedure: You may stay in bed or sit until you are completely comfortable  Healthcare providers will monitor you closely for any problems  Do not get out of bed until your healthcare provider says it is okay  When your healthcare provider sees that you are okay, you will be able to go home  You will need to keep the vaginal pad in place after your procedure  · Medicines:     ¨ Pain medicine  may be given  Do not wait until the pain is severe before you ask for more medicine  ¨ Antibiotics  help prevent infection caused by bacteria  RISKS:   During your HSG procedure, your body may react to the dye  A reaction may cause nausea, skin itching, hives, or breathing problems  Tell your healthcare provider right away if you have any of these symptoms after you have received the dye  You could also bleed more than expected or get an infection  Even after you have an HSG, you may need other tests to find the cause of your health problem  Without this procedure, healthcare providers may not know the cause of your medical condition  The signs and symptoms you presently have may continue and become worse  CARE AGREEMENT:   You have the right to help plan your care  Learn about your health condition and how it may be treated  Discuss treatment options with your caregivers to decide what care you want to receive  You always have the right to refuse treatment  © 2017 [employer]  Information is for End User's use only and may not be sold, redistributed or otherwise used for commercial purposes   All illustrations and images included in CareNotes® are the copyrighted property of [employer]  or [employer]  The above information is an  only  It is not intended as medical advice for individual conditions or treatments  Talk to your doctor, nurse or pharmacist before following any medical regimen to see if it is safe and effective for you

## 2019-07-29 ENCOUNTER — OFFICE VISIT (OUTPATIENT)
Dept: FAMILY MEDICINE CLINIC | Facility: CLINIC | Age: 29
End: 2019-07-29

## 2019-07-29 VITALS
BODY MASS INDEX: 30.22 KG/M2 | HEART RATE: 80 BPM | DIASTOLIC BLOOD PRESSURE: 72 MMHG | RESPIRATION RATE: 16 BRPM | WEIGHT: 164.2 LBS | TEMPERATURE: 98.5 F | HEIGHT: 62 IN | SYSTOLIC BLOOD PRESSURE: 112 MMHG

## 2019-07-29 DIAGNOSIS — R10.2 PELVIC PAIN: Primary | ICD-10-CM

## 2019-07-29 LAB
SL AMB  POCT GLUCOSE, UA: NEGATIVE
SL AMB LEUKOCYTE ESTERASE,UA: NEGATIVE
SL AMB POCT BILIRUBIN,UA: NEGATIVE
SL AMB POCT BLOOD,UA: NORMAL
SL AMB POCT CLARITY,UA: CLEAR
SL AMB POCT COLOR,UA: YELLOW
SL AMB POCT KETONES,UA: NEGATIVE
SL AMB POCT NITRITE,UA: NEGATIVE
SL AMB POCT PH,UA: 6
SL AMB POCT SPECIFIC GRAVITY,UA: 1.03
SL AMB POCT URINE PROTEIN: NEGATIVE
SL AMB POCT UROBILINOGEN: 0.2

## 2019-07-29 PROCEDURE — 99213 OFFICE O/P EST LOW 20 MIN: CPT | Performed by: FAMILY MEDICINE

## 2019-07-29 PROCEDURE — 96372 THER/PROPH/DIAG INJ SC/IM: CPT | Performed by: FAMILY MEDICINE

## 2019-07-29 PROCEDURE — 81002 URINALYSIS NONAUTO W/O SCOPE: CPT | Performed by: FAMILY MEDICINE

## 2019-07-29 RX ORDER — NAPROXEN 500 MG/1
500 TABLET ORAL 2 TIMES DAILY WITH MEALS
Qty: 15 TABLET | Refills: 0 | Status: SHIPPED | OUTPATIENT
Start: 2019-07-29

## 2019-07-29 RX ORDER — KETOROLAC TROMETHAMINE 30 MG/ML
30 INJECTION, SOLUTION INTRAMUSCULAR; INTRAVENOUS ONCE
Status: COMPLETED | OUTPATIENT
Start: 2019-07-29 | End: 2019-07-29

## 2019-07-29 RX ADMIN — KETOROLAC TROMETHAMINE 30 MG: 30 INJECTION, SOLUTION INTRAMUSCULAR; INTRAVENOUS at 17:39

## 2019-07-29 NOTE — ASSESSMENT & PLAN NOTE
Dysmenorrhea  -Urine dip in the office wnl  -OTC tylenol and motrin not helping with pain  -Toradol 30 mg IM given at this visit  -Naproxen 500 mg BID for 3-4 days sent to her pharmacy  -Patient requesting tramadol   She was explained NSAIDs are the first line for dysmenorrhea and advised to avoid tramadol if pregnancy is intended due side effects of fetus and in reproductive function   -Recommended patient to follow up with Infertility specialist

## 2019-07-29 NOTE — PROGRESS NOTES
Izabella Best 1990 female MRN: [id number]    Family Medicine Acute Visit    ASSESSMENT/PLAN  Problem List Items Addressed This Visit        Other    Pelvic pain - Primary     Dysmenorrhea  -Urine dip in the office wnl  -OTC tylenol and motrin not helping with pain  -Toradol 30 mg IM given at this visit  -Naproxen 500 mg BID for 3-4 days sent to her pharmacy  -Patient requesting tramadol  She was explained NSAIDs are the first line for dysmenorrhea and advised to avoid tramadol if pregnancy is intended due side effects of fetus and in reproductive function   -Recommended patient to follow up with Infertility specialist           Relevant Medications    ketorolac (TORADOL) injection 30 mg    naproxen (NAPROSYN) 500 mg tablet    Other Relevant Orders    POCT urine dip (Completed)                No future appointments  SUBJECTIVE  CC: Ovarian Cyst      HPI:    Izabella Best is a 33 y o  female who presents for an acute visit for pelvic pain  She reports she started her period on Saturday  She had her period two times this month and reports worsening pelvic pain with each episode  She has history of ovarian cysts previously treated with metformin and OCPs  She is following an infertility specialist since she wants to get pregnant soon and mediations were discontinued a few months ago  She has tried OTC tylenol and motrin with no improvement  Patient mentions that Tramadol was given for this and that helped with the pain    Review of Systems   Constitutional: Negative for appetite change and fever  Eyes: Negative for visual disturbance  Respiratory: Negative for cough, chest tightness and shortness of breath  Cardiovascular: Negative for chest pain, palpitations and leg swelling  Gastrointestinal: Negative for abdominal distention, abdominal pain, constipation, diarrhea, nausea and vomiting  Genitourinary: Positive for menstrual problem and pelvic pain   Negative for difficulty urinating, frequency and urgency  Musculoskeletal: Negative for back pain and neck pain  Skin: Negative for pallor  Neurological: Negative for light-headedness, numbness and headaches  Psychiatric/Behavioral: Negative for agitation and behavioral problems  The patient is not nervous/anxious  Historical Information   The patient history was reviewed as follows:  Past Medical History:   Diagnosis Date    Asthma     Disease of thyroid gland     Kidney stones          History reviewed  No pertinent surgical history    Family History   Problem Relation Age of Onset    No Known Problems Mother     Diabetes Family     Hypertension Family     No Known Problems Father     No Known Problems Sister     No Known Problems Brother       Social History   Social History     Substance and Sexual Activity   Alcohol Use Yes    Comment: SOCIAL      Social History     Substance and Sexual Activity   Drug Use No    Comment: HISTORY OF USING MARIJUANA: DAILY USE- AS PER ALL SCRIPTS      Social History     Tobacco Use   Smoking Status Never Smoker   Smokeless Tobacco Never Used   Tobacco Comment    FORMER SMOKER AS PER ALL SCRIPTS- QUIT IN 2012       Medications:     Current Outpatient Medications:     fluticasone (FLOVENT HFA) 110 MCG/ACT inhaler, Inhale 2 puffs 2 (two) times a day, Disp: 1 Inhaler, Rfl: 5    letrozole (FEMARA) 2 5 mg tablet, Take 3 tablets (7 5 mg total) by mouth daily Take 7 5mg daily for 5 days ( on days 3-7 of your cycle ) , Disp: 15 tablet, Rfl: 3    levothyroxine 75 mcg tablet, TAKE 1 TABLET (75 MCG TOTAL) BY MOUTH DAILY, Disp: 90 tablet, Rfl: 1    Prenatal Vit-Fe Fumarate-FA (PRENATAL VITAMIN) 28-0 8 mg, Take 1 tablet by mouth daily, Disp: 30 tablet, Rfl: 11    ranitidine (ZANTAC) 150 MG capsule, Take 1 capsule (150 mg total) by mouth 2 (two) times a day, Disp: 60 capsule, Rfl: 1    VENTOLIN  (90 Base) MCG/ACT inhaler, Inhale 2 puffs, Disp: , Rfl: 0    naproxen (NAPROSYN) 500 mg tablet, Take 1 tablet (500 mg total) by mouth 2 (two) times a day with meals, Disp: 15 tablet, Rfl: 0    Current Facility-Administered Medications:     ketorolac (TORADOL) injection 30 mg, 30 mg, Intramuscular, Once, Josephine Talbot MD    No Known Allergies    OBJECTIVE  Vitals:   Vitals:    07/29/19 1553   BP: 112/72   BP Location: Left arm   Patient Position: Sitting   Cuff Size: Large   Pulse: 80   Resp: 16   Temp: 98 5 °F (36 9 °C)   TempSrc: Tympanic   Weight: 74 5 kg (164 lb 3 2 oz)   Height: 5' 2" (1 575 m)         Physical Exam   Constitutional: She is oriented to person, place, and time  She appears well-developed and well-nourished  She appears distressed  HENT:   Head: Normocephalic and atraumatic  Neck: Normal range of motion  Neck supple  No thyromegaly present  Cardiovascular: Normal rate, regular rhythm, normal heart sounds and intact distal pulses  Exam reveals no gallop and no friction rub  No murmur heard  Pulmonary/Chest: Effort normal and breath sounds normal  No respiratory distress  She has no wheezes  She has no rales  She exhibits no tenderness  Abdominal: Soft  Bowel sounds are normal  She exhibits no distension and no mass  There is tenderness (to palpation in pelvic area)  There is no rebound and no guarding  Musculoskeletal: Normal range of motion  She exhibits no edema, tenderness or deformity  Lymphadenopathy:     She has no cervical adenopathy  Neurological: She is alert and oriented to person, place, and time  Skin: Skin is warm and dry  No rash noted  She is not diaphoretic  No erythema  Psychiatric: Her behavior is normal  Judgment and thought content normal  Her affect is angry  Vitals reviewed                   Josephine Talbot MD, PGY-2  [facility]   7/29/2019

## 2019-11-25 DIAGNOSIS — E03.9 HYPOTHYROIDISM, UNSPECIFIED TYPE: ICD-10-CM

## 2019-11-25 RX ORDER — LEVOTHYROXINE SODIUM 0.07 MG/1
75 TABLET ORAL DAILY
Qty: 90 TABLET | Refills: 1 | Status: SHIPPED | OUTPATIENT
Start: 2019-11-25 | End: 2020-06-25 | Stop reason: SDUPTHER

## 2019-12-25 ENCOUNTER — HOSPITAL ENCOUNTER (EMERGENCY)
Age: 29
Discharge: HOME | End: 2019-12-25
Attending: EMERGENCY MEDICINE

## 2019-12-25 ENCOUNTER — APPOINTMENT (OUTPATIENT)
Dept: GENERAL RADIOLOGY | Age: 29
End: 2019-12-25
Attending: EMERGENCY MEDICINE

## 2019-12-25 VITALS
WEIGHT: 162 LBS | DIASTOLIC BLOOD PRESSURE: 61 MMHG | BODY MASS INDEX: 29.81 KG/M2 | SYSTOLIC BLOOD PRESSURE: 128 MMHG | RESPIRATION RATE: 20 BRPM | TEMPERATURE: 98.2 F | HEART RATE: 100 BPM | HEIGHT: 62 IN | OXYGEN SATURATION: 98 %

## 2019-12-25 DIAGNOSIS — J40 BRONCHITIS: ICD-10-CM

## 2019-12-25 DIAGNOSIS — J45.901 EXACERBATION OF ASTHMA, UNSPECIFIED ASTHMA SEVERITY, UNSPECIFIED WHETHER PERSISTENT: Primary | ICD-10-CM

## 2019-12-25 PROCEDURE — 10002803 HB RX 637: Performed by: EMERGENCY MEDICINE

## 2019-12-25 PROCEDURE — 94644 CONT INHLJ TX 1ST HOUR: CPT

## 2019-12-25 PROCEDURE — 99284 EMERGENCY DEPT VISIT MOD MDM: CPT

## 2019-12-25 PROCEDURE — 10002801 HB RX 250 W/O HCPCS: Performed by: EMERGENCY MEDICINE

## 2019-12-25 PROCEDURE — 71045 X-RAY EXAM CHEST 1 VIEW: CPT | Performed by: RADIOLOGY

## 2019-12-25 PROCEDURE — 71045 X-RAY EXAM CHEST 1 VIEW: CPT

## 2019-12-25 RX ORDER — ALBUTEROL SULFATE 90 UG/1
2 AEROSOL, METERED RESPIRATORY (INHALATION) EVERY 4 HOURS PRN
Qty: 8.5 G | Refills: 0 | Status: SHIPPED | OUTPATIENT
Start: 2019-12-25

## 2019-12-25 RX ORDER — ALBUTEROL SULFATE 2.5 MG/3ML
2.5 SOLUTION RESPIRATORY (INHALATION) EVERY 6 HOURS PRN
Qty: 375 ML | Refills: 12 | Status: SHIPPED | OUTPATIENT
Start: 2019-12-25

## 2019-12-25 RX ORDER — ALBUTEROL SULFATE 2.5 MG/3ML
7.5 SOLUTION RESPIRATORY (INHALATION) ONCE
Status: COMPLETED | OUTPATIENT
Start: 2019-12-25 | End: 2019-12-25

## 2019-12-25 RX ORDER — PREDNISONE 20 MG/1
60 TABLET ORAL ONCE
Status: COMPLETED | OUTPATIENT
Start: 2019-12-25 | End: 2019-12-25

## 2019-12-25 RX ORDER — PREDNISONE 20 MG/1
TABLET ORAL
Qty: 21 TABLET | Refills: 0 | Status: SHIPPED | OUTPATIENT
Start: 2019-12-25 | End: 2021-11-03 | Stop reason: ALTCHOICE

## 2019-12-25 RX ORDER — IPRATROPIUM BROMIDE AND ALBUTEROL SULFATE 2.5; .5 MG/3ML; MG/3ML
3 SOLUTION RESPIRATORY (INHALATION) ONCE
Status: COMPLETED | OUTPATIENT
Start: 2019-12-25 | End: 2019-12-25

## 2019-12-25 RX ADMIN — ALBUTEROL SULFATE 7.5 MG: 2.5 SOLUTION RESPIRATORY (INHALATION) at 21:02

## 2019-12-25 RX ADMIN — IPRATROPIUM BROMIDE AND ALBUTEROL SULFATE 3 ML: 2.5; .5 SOLUTION RESPIRATORY (INHALATION) at 21:02

## 2019-12-25 RX ADMIN — PREDNISONE 60 MG: 20 TABLET ORAL at 21:09

## 2019-12-25 ASSESSMENT — ENCOUNTER SYMPTOMS
VOMITING: 1
DIZZINESS: 0
DIARRHEA: 0
CONFUSION: 0
ABDOMINAL PAIN: 0
EYE DISCHARGE: 0
SHORTNESS OF BREATH: 1
EYE PAIN: 0
EYES NEGATIVE: 1
FEVER: 0
COUGH: 1

## 2019-12-25 ASSESSMENT — PAIN DESCRIPTION - PAIN TYPE: TYPE: CHEST PAIN

## 2019-12-25 ASSESSMENT — PAIN SCALES - GENERAL: PAINLEVEL_OUTOF10: 8

## 2020-06-11 ENCOUNTER — TELEPHONE (OUTPATIENT)
Dept: FAMILY MEDICINE CLINIC | Facility: CLINIC | Age: 30
End: 2020-06-11

## 2020-06-12 ENCOUNTER — APPOINTMENT (OUTPATIENT)
Dept: LAB | Facility: HOSPITAL | Age: 30
End: 2020-06-12
Payer: MEDICARE

## 2020-06-12 ENCOUNTER — OFFICE VISIT (OUTPATIENT)
Dept: FAMILY MEDICINE CLINIC | Facility: CLINIC | Age: 30
End: 2020-06-12

## 2020-06-12 VITALS
DIASTOLIC BLOOD PRESSURE: 74 MMHG | BODY MASS INDEX: 28.6 KG/M2 | RESPIRATION RATE: 16 BRPM | HEIGHT: 62 IN | TEMPERATURE: 97.8 F | WEIGHT: 155.4 LBS | SYSTOLIC BLOOD PRESSURE: 114 MMHG | HEART RATE: 74 BPM

## 2020-06-12 DIAGNOSIS — E03.9 HYPOTHYROIDISM, UNSPECIFIED TYPE: ICD-10-CM

## 2020-06-12 DIAGNOSIS — E04.9 ENLARGED THYROID GLAND: ICD-10-CM

## 2020-06-12 DIAGNOSIS — Z11.4 SCREENING FOR HIV (HUMAN IMMUNODEFICIENCY VIRUS): ICD-10-CM

## 2020-06-12 DIAGNOSIS — R94.31 FLATTENED T WAVE: ICD-10-CM

## 2020-06-12 DIAGNOSIS — J45.21 MILD INTERMITTENT ASTHMA WITH ACUTE EXACERBATION: ICD-10-CM

## 2020-06-12 DIAGNOSIS — H53.2 DOUBLE VISION: ICD-10-CM

## 2020-06-12 DIAGNOSIS — R53.83 OTHER FATIGUE: ICD-10-CM

## 2020-06-12 DIAGNOSIS — E03.9 HYPOTHYROIDISM, UNSPECIFIED TYPE: Primary | ICD-10-CM

## 2020-06-12 PROBLEM — N92.1 MENORRHAGIA WITH IRREGULAR CYCLE: Status: RESOLVED | Noted: 2018-11-28 | Resolved: 2020-06-12

## 2020-06-12 LAB
25(OH)D3 SERPL-MCNC: 26.2 NG/ML (ref 30–100)
ALBUMIN SERPL BCP-MCNC: 4 G/DL (ref 3.5–5)
ALP SERPL-CCNC: 62 U/L (ref 46–116)
ALT SERPL W P-5'-P-CCNC: 25 U/L (ref 12–78)
ANION GAP SERPL CALCULATED.3IONS-SCNC: 7 MMOL/L (ref 4–13)
AST SERPL W P-5'-P-CCNC: 13 U/L (ref 5–45)
BASOPHILS # BLD AUTO: 0.02 THOUSANDS/ΜL (ref 0–0.1)
BASOPHILS NFR BLD AUTO: 1 % (ref 0–1)
BILIRUB SERPL-MCNC: 0.27 MG/DL (ref 0.2–1)
BUN SERPL-MCNC: 11 MG/DL (ref 5–25)
CALCIUM SERPL-MCNC: 8.9 MG/DL (ref 8.3–10.1)
CHLORIDE SERPL-SCNC: 108 MMOL/L (ref 100–108)
CO2 SERPL-SCNC: 24 MMOL/L (ref 21–32)
CREAT SERPL-MCNC: 0.73 MG/DL (ref 0.6–1.3)
EOSINOPHIL # BLD AUTO: 0.11 THOUSAND/ΜL (ref 0–0.61)
EOSINOPHIL NFR BLD AUTO: 3 % (ref 0–6)
ERYTHROCYTE [DISTWIDTH] IN BLOOD BY AUTOMATED COUNT: 12 % (ref 11.6–15.1)
GFR SERPL CREATININE-BSD FRML MDRD: 111 ML/MIN/1.73SQ M
GLUCOSE P FAST SERPL-MCNC: 107 MG/DL (ref 65–99)
HCT VFR BLD AUTO: 39.1 % (ref 34.8–46.1)
HGB BLD-MCNC: 12.8 G/DL (ref 11.5–15.4)
IMM GRANULOCYTES # BLD AUTO: 0.01 THOUSAND/UL (ref 0–0.2)
IMM GRANULOCYTES NFR BLD AUTO: 0 % (ref 0–2)
LYMPHOCYTES # BLD AUTO: 1.84 THOUSANDS/ΜL (ref 0.6–4.47)
LYMPHOCYTES NFR BLD AUTO: 43 % (ref 14–44)
MAGNESIUM SERPL-MCNC: 2.6 MG/DL (ref 1.6–2.6)
MCH RBC QN AUTO: 32.1 PG (ref 26.8–34.3)
MCHC RBC AUTO-ENTMCNC: 32.7 G/DL (ref 31.4–37.4)
MCV RBC AUTO: 98 FL (ref 82–98)
MONOCYTES # BLD AUTO: 0.28 THOUSAND/ΜL (ref 0.17–1.22)
MONOCYTES NFR BLD AUTO: 7 % (ref 4–12)
NEUTROPHILS # BLD AUTO: 2.02 THOUSANDS/ΜL (ref 1.85–7.62)
NEUTS SEG NFR BLD AUTO: 46 % (ref 43–75)
NRBC BLD AUTO-RTO: 0 /100 WBCS
PHOSPHATE SERPL-MCNC: 3 MG/DL (ref 2.7–4.5)
PLATELET # BLD AUTO: 290 THOUSANDS/UL (ref 149–390)
PMV BLD AUTO: 10.3 FL (ref 8.9–12.7)
POTASSIUM SERPL-SCNC: 3.7 MMOL/L (ref 3.5–5.3)
PROT SERPL-MCNC: 7.2 G/DL (ref 6.4–8.2)
RBC # BLD AUTO: 3.99 MILLION/UL (ref 3.81–5.12)
SODIUM SERPL-SCNC: 139 MMOL/L (ref 136–145)
TSH SERPL DL<=0.05 MIU/L-ACNC: 1.12 UIU/ML (ref 0.36–3.74)
VIT B12 SERPL-MCNC: 585 PG/ML (ref 100–900)
WBC # BLD AUTO: 4.28 THOUSAND/UL (ref 4.31–10.16)

## 2020-06-12 PROCEDURE — 80053 COMPREHEN METABOLIC PANEL: CPT

## 2020-06-12 PROCEDURE — 82306 VITAMIN D 25 HYDROXY: CPT

## 2020-06-12 PROCEDURE — 36415 COLL VENOUS BLD VENIPUNCTURE: CPT | Performed by: FAMILY MEDICINE

## 2020-06-12 PROCEDURE — 85025 COMPLETE CBC W/AUTO DIFF WBC: CPT

## 2020-06-12 PROCEDURE — 86376 MICROSOMAL ANTIBODY EACH: CPT | Performed by: FAMILY MEDICINE

## 2020-06-12 PROCEDURE — 87389 HIV-1 AG W/HIV-1&-2 AB AG IA: CPT

## 2020-06-12 PROCEDURE — 3008F BODY MASS INDEX DOCD: CPT | Performed by: FAMILY MEDICINE

## 2020-06-12 PROCEDURE — 84443 ASSAY THYROID STIM HORMONE: CPT

## 2020-06-12 PROCEDURE — 84100 ASSAY OF PHOSPHORUS: CPT

## 2020-06-12 PROCEDURE — 83735 ASSAY OF MAGNESIUM: CPT

## 2020-06-12 PROCEDURE — 86800 THYROGLOBULIN ANTIBODY: CPT | Performed by: FAMILY MEDICINE

## 2020-06-12 PROCEDURE — 99213 OFFICE O/P EST LOW 20 MIN: CPT | Performed by: FAMILY MEDICINE

## 2020-06-12 PROCEDURE — 1036F TOBACCO NON-USER: CPT | Performed by: FAMILY MEDICINE

## 2020-06-12 PROCEDURE — 82607 VITAMIN B-12: CPT

## 2020-06-13 LAB
THYROGLOB AB SERPL-ACNC: <1 IU/ML (ref 0–0.9)
THYROPEROXIDASE AB SERPL-ACNC: 278 IU/ML (ref 0–34)

## 2020-06-14 LAB — HIV 1+2 AB+HIV1 P24 AG SERPL QL IA: NORMAL

## 2020-06-15 ENCOUNTER — TELEPHONE (OUTPATIENT)
Dept: FAMILY MEDICINE CLINIC | Facility: CLINIC | Age: 30
End: 2020-06-15

## 2020-06-18 ENCOUNTER — TELEPHONE (OUTPATIENT)
Dept: FAMILY MEDICINE CLINIC | Facility: CLINIC | Age: 30
End: 2020-06-18

## 2020-06-25 ENCOUNTER — TELEPHONE (OUTPATIENT)
Dept: FAMILY MEDICINE CLINIC | Facility: CLINIC | Age: 30
End: 2020-06-25

## 2020-06-25 ENCOUNTER — TELEMEDICINE (OUTPATIENT)
Dept: FAMILY MEDICINE CLINIC | Facility: CLINIC | Age: 30
End: 2020-06-25

## 2020-06-25 DIAGNOSIS — E55.9 VITAMIN D INSUFFICIENCY: ICD-10-CM

## 2020-06-25 DIAGNOSIS — R13.19 ESOPHAGEAL DYSPHAGIA: Primary | ICD-10-CM

## 2020-06-25 DIAGNOSIS — E03.9 HYPOTHYROIDISM, UNSPECIFIED TYPE: ICD-10-CM

## 2020-06-25 PROCEDURE — G2025 DIS SITE TELE SVCS RHC/FQHC: HCPCS | Performed by: FAMILY MEDICINE

## 2020-06-25 RX ORDER — LEVOTHYROXINE SODIUM 0.07 MG/1
75 TABLET ORAL DAILY
Qty: 90 TABLET | Refills: 0 | Status: SHIPPED | OUTPATIENT
Start: 2020-06-25 | End: 2020-09-03

## 2020-06-25 RX ORDER — MELATONIN
1000 DAILY
Qty: 60 TABLET | Refills: 0 | Status: SHIPPED | OUTPATIENT
Start: 2020-06-25 | End: 2020-08-26

## 2020-08-26 DIAGNOSIS — E55.9 VITAMIN D INSUFFICIENCY: ICD-10-CM

## 2020-08-26 RX ORDER — AVOBENZONE, HOMOSALATE, OCTISALATE, OCTOCRYLENE 30; 100; 50; 25 MG/ML; MG/ML; MG/ML; MG/ML
SPRAY TOPICAL
Qty: 60 TABLET | Refills: 0 | Status: SHIPPED | OUTPATIENT
Start: 2020-08-26

## 2020-09-03 DIAGNOSIS — E03.9 HYPOTHYROIDISM, UNSPECIFIED TYPE: ICD-10-CM

## 2020-09-03 RX ORDER — LEVOTHYROXINE SODIUM 0.07 MG/1
TABLET ORAL
Qty: 90 TABLET | Refills: 0 | Status: SHIPPED | OUTPATIENT
Start: 2020-09-03 | End: 2020-09-10 | Stop reason: SDUPTHER

## 2020-09-10 ENCOUNTER — OFFICE VISIT (OUTPATIENT)
Dept: FAMILY MEDICINE CLINIC | Facility: CLINIC | Age: 30
End: 2020-09-10

## 2020-09-10 VITALS
TEMPERATURE: 96.6 F | RESPIRATION RATE: 18 BRPM | SYSTOLIC BLOOD PRESSURE: 110 MMHG | BODY MASS INDEX: 28.52 KG/M2 | HEIGHT: 62 IN | WEIGHT: 155 LBS | DIASTOLIC BLOOD PRESSURE: 70 MMHG | HEART RATE: 78 BPM

## 2020-09-10 DIAGNOSIS — E03.9 HYPOTHYROIDISM, UNSPECIFIED TYPE: ICD-10-CM

## 2020-09-10 PROCEDURE — 99213 OFFICE O/P EST LOW 20 MIN: CPT | Performed by: FAMILY MEDICINE

## 2020-09-10 RX ORDER — LEVOTHYROXINE SODIUM 0.07 MG/1
75 TABLET ORAL DAILY
Qty: 90 TABLET | Refills: 3 | Status: SHIPPED | OUTPATIENT
Start: 2020-09-10 | End: 2020-12-09

## 2020-09-10 NOTE — ASSESSMENT & PLAN NOTE
Well controlled    TSH within goal last checked June 2020  Will refill levothyroxine 75 mcg daily  Counseled patient on how to take levothyroxine

## 2020-09-10 NOTE — PROGRESS NOTES
Assessment/Plan:    Hypothyroid  Well controlled  TSH within goal last checked June 2020  Will refill levothyroxine 75 mcg daily  Counseled patient on how to take levothyroxine    BMI Counseling: Body mass index is 28 35 kg/m²  The BMI is above normal  Nutrition recommendations include reducing portion sizes, decreasing overall calorie intake, 3-5 servings of fruits/vegetables daily and reducing fast food intake  Exercise recommendations include moderate aerobic physical activity for 150 minutes/week  Problem List Items Addressed This Visit        Endocrine    Hypothyroid     Well controlled  TSH within goal last checked June 2020  Will refill levothyroxine 75 mcg daily  Counseled patient on how to take levothyroxine         Relevant Medications    levothyroxine 75 mcg tablet            Subjective:      Patient ID: Karen Newell is a 31 y o  female  Today's visits for refill of levothyroxine  She is currently on 75 mcg daily  TSH last checked in June 2020 and it was within range  She says she is feeling well and denies any nausea constipation diaphoresis palpitations heat or cold intolerance fatigue problems with concentration or mood headaches dizziness voice changes and skin or hair changes  The following portions of the patient's history were reviewed and updated as appropriate: allergies, current medications, past family history, past medical history, past social history, past surgical history and problem list     Review of Systems   Constitutional: Negative for fatigue  HENT: Negative for trouble swallowing and voice change  Eyes: Negative for visual disturbance  Respiratory: Negative for cough and shortness of breath  Cardiovascular: Negative for chest pain and palpitations  Gastrointestinal: Negative for abdominal pain, constipation, diarrhea, nausea and vomiting  Endocrine: Negative for cold intolerance and heat intolerance  Skin: Negative for color change  Neurological: Negative for dizziness and headaches  Psychiatric/Behavioral: Negative for decreased concentration and dysphoric mood  All other systems reviewed and are negative  Objective:      /70 (BP Location: Left arm, Patient Position: Sitting, Cuff Size: Large)   Pulse 78   Temp (!) 96 6 °F (35 9 °C) (Tympanic)   Resp 18   Ht 5' 2" (1 575 m)   Wt 70 3 kg (155 lb)   BMI 28 35 kg/m²          Physical Exam  Vitals signs and nursing note reviewed  Constitutional:       General: She is not in acute distress  Appearance: Normal appearance  She is not ill-appearing, toxic-appearing or diaphoretic  HENT:      Head: Normocephalic and atraumatic  Mouth/Throat:      Mouth: Mucous membranes are moist       Pharynx: Oropharynx is clear  Eyes:      Extraocular Movements: Extraocular movements intact  Conjunctiva/sclera: Conjunctivae normal    Neck:      Musculoskeletal: Normal range of motion and neck supple  No neck rigidity or muscular tenderness  Comments: Thyroid normal  Cardiovascular:      Rate and Rhythm: Normal rate and regular rhythm  Pulmonary:      Effort: Pulmonary effort is normal       Breath sounds: Normal breath sounds  Lymphadenopathy:      Cervical: No cervical adenopathy  Neurological:      Mental Status: She is alert and oriented to person, place, and time     Psychiatric:         Mood and Affect: Mood normal

## 2020-10-28 DIAGNOSIS — E55.9 VITAMIN D INSUFFICIENCY: ICD-10-CM

## 2020-10-28 RX ORDER — AVOBENZONE, HOMOSALATE, OCTISALATE, OCTOCRYLENE 30; 100; 50; 25 MG/ML; MG/ML; MG/ML; MG/ML
SPRAY TOPICAL
Qty: 60 TABLET | Refills: 0 | OUTPATIENT
Start: 2020-10-28

## 2021-07-20 ENCOUNTER — TELEPHONE (OUTPATIENT)
Dept: FAMILY MEDICINE CLINIC | Facility: CLINIC | Age: 31
End: 2021-07-20

## 2021-07-20 NOTE — TELEPHONE ENCOUNTER
----- Message from Thu Taylor MD sent at 7/20/2021 12:07 PM EDT -----  Regarding: MAW visit pls schedule appointment  Hello,  Please schedule patient for MAW as soon as possible at earliest convenience  Thank you

## 2021-07-20 NOTE — TELEPHONE ENCOUNTER
----- Message from Priyanka Penaloza MD sent at 7/20/2021 12:07 PM EDT -----  Regarding: MAW visit pls schedule appointment  Hello,  Please schedule patient for MAW as soon as possible at earliest convenience  Thank you

## 2021-10-27 ENCOUNTER — TELEPHONE (OUTPATIENT)
Dept: OBGYN | Age: 31
End: 2021-10-27

## 2021-10-29 DIAGNOSIS — E03.9 HYPOTHYROIDISM, UNSPECIFIED: Primary | ICD-10-CM

## 2021-11-03 ENCOUNTER — OFFICE VISIT (OUTPATIENT)
Dept: ENDOCRINOLOGY | Age: 31
End: 2021-11-03
Attending: INTERNAL MEDICINE

## 2021-11-03 VITALS
BODY MASS INDEX: 30.14 KG/M2 | DIASTOLIC BLOOD PRESSURE: 62 MMHG | SYSTOLIC BLOOD PRESSURE: 108 MMHG | HEART RATE: 70 BPM | HEIGHT: 62 IN | WEIGHT: 163.8 LBS

## 2021-11-03 DIAGNOSIS — E06.3 HYPOTHYROIDISM, ACQUIRED, AUTOIMMUNE: ICD-10-CM

## 2021-11-03 DIAGNOSIS — N97.9 FEMALE INFERTILITY: ICD-10-CM

## 2021-11-03 DIAGNOSIS — E28.2 PCOS (POLYCYSTIC OVARIAN SYNDROME): Primary | ICD-10-CM

## 2021-11-03 PROCEDURE — 99204 OFFICE O/P NEW MOD 45 MIN: CPT | Performed by: INTERNAL MEDICINE

## 2021-11-03 RX ORDER — PNV NO.95/FERROUS FUM/FOLIC AC 28MG-0.8MG
1 TABLET ORAL DAILY
Qty: 100 TABLET | Refills: 3 | Status: SHIPPED | OUTPATIENT
Start: 2021-11-03

## 2021-11-03 RX ORDER — LEVOTHYROXINE SODIUM 0.1 MG/1
100 TABLET ORAL DAILY
Qty: 90 TABLET | Refills: 3 | Status: SHIPPED | OUTPATIENT
Start: 2021-11-03

## 2022-01-19 NOTE — PROGRESS NOTES
Assessment/Plan:    Gastroesophageal reflux disease without esophagitis  For ocasional heart burns will give her Rantidine 150 mg capsule BID  Advised to avoid spicy food      Hypothyroid  Patient is taking Levothyroxine 75 mcg oral daily  No active complaints right now  Will continue her levothyroxine  Advice to come for follow-up in 6 months  Repeat her TSH level in 6 months       Diagnoses and all orders for this visit:    Hypothyroidism, unspecified type  -     TSH, 3rd generation; Future    Gastroesophageal reflux disease without esophagitis  -     ranitidine (ZANTAC) 150 MG capsule; Take 1 capsule (150 mg total) by mouth 2 (two) times a day    Other orders  -     Cancel: TSH, 3rd generation; Future      follow-up  advised repeat TSH and follow up in 6 months    Subjective:      Patient ID: Fernandez An is a 33 y o  female  HPI   Patient 32 y/o female came for follow up for her Hypothyroidism  Patient complained of difficulty in swallowing her own saliva occasionally  She also complains of heartburns every now and then  Usually heartburns and associated with eating spicy food  She also complain of mild cough occasionally at nighttime  No history of abdominal pain nausea vomiting  No history of abdominal pain with eating food  No history of chest pain ,shortness of breath  No history of any urinary complaint  LMP 07/03/19      The following portions of the patient's history were reviewed and updated as appropriate:   She  has a past medical history of Asthma, Disease of thyroid gland, and Kidney stones    She   Patient Active Problem List    Diagnosis Date Noted    Gastroesophageal reflux disease without esophagitis 07/19/2019    Menorrhagia with irregular cycle 11/28/2018    Infertility, female 11/09/2018    Mild intermittent asthma with exacerbation 11/05/2018    Depression 02/14/2018    Suicide attempt ([facility] ) 76/81/0479    Self-inflicted laceration of wrist, subsequent encounter 02/14/2018 Refill oxycodone    Hypothyroid 02/14/2018    Mild intermittent asthma with acute exacerbation 02/14/2018    PCOS (polycystic ovarian syndrome) 02/14/2018    Impaired fasting glucose 02/14/2018     She  has no past surgical history on file  Her family history includes Diabetes in her family; Hypertension in her family; No Known Problems in her brother, father, mother, and sister  She  reports that she has never smoked  She has never used smokeless tobacco  She reports that she drinks alcohol  She reports that she does not use drugs  Current Outpatient Medications   Medication Sig Dispense Refill    fluticasone (FLOVENT HFA) 110 MCG/ACT inhaler Inhale 2 puffs 2 (two) times a day 1 Inhaler 5    letrozole (FEMARA) 2 5 mg tablet Take 2 tablets (5 mg total) by mouth daily 30 tablet 0    levothyroxine 75 mcg tablet Take 1 tablet (75 mcg total) by mouth daily 90 tablet 1    VENTOLIN  (90 Base) MCG/ACT inhaler Inhale 2 puffs  0    norgestimate-ethinyl estradiol (ORTHO-CYCLEN) 0 25-35 MG-MCG per tablet Take 1 tablet by mouth daily (Patient not taking: Reported on 4/4/2019) 28 tablet 3    Prenatal Vit-Fe Fumarate-FA (PRENATAL VITAMIN) 28-0 8 mg Take 1 tablet by mouth daily 30 tablet 11    ranitidine (ZANTAC) 150 MG capsule Take 1 capsule (150 mg total) by mouth 2 (two) times a day 60 capsule 1     No current facility-administered medications for this visit  She has No Known Allergies       Review of Systems   Constitutional: Negative for appetite change and fatigue  HENT: Negative for congestion  Respiratory: Negative for cough, chest tightness and shortness of breath  Cardiovascular: Negative for chest pain  Gastrointestinal: Negative for abdominal pain  Endocrine: Negative for cold intolerance and heat intolerance  Genitourinary: Negative for difficulty urinating and pelvic pain  Psychiatric/Behavioral: Negative for behavioral problems           Objective:      /70   Pulse 72   Temp 98 1 °F (36 7 °C)   Resp 16   Ht 5' 2" (1 575 m)   Wt 74 8 kg (164 lb 12 8 oz)   LMP 07/03/2019   BMI 30 14 kg/m²          Physical Exam   Constitutional: She appears well-developed and well-nourished  No distress  HENT:   Head: Normocephalic  Eyes: Pupils are equal, round, and reactive to light  Cardiovascular: Normal rate, regular rhythm and normal heart sounds  Pulmonary/Chest: Breath sounds normal    Abdominal: Soft  Skin: Skin is warm  Capillary refill takes less than 2 seconds  Psychiatric: She has a normal mood and affect   Her behavior is normal

## 2024-05-14 NOTE — PATIENT INSTRUCTIONS
Return to clinic in 2 weeks for follow up hypothyroid  Establish care with psychiatrist and therapist   Stitches Removal   WHAT YOU NEED TO KNOW:   How are stitches removed? Stitches may need to be removed in 3 to 14 days depending on the location of your wound  Your healthcare provider will use sterile forceps or tweezers to  the knot of each stitch  He will cut the stitch with scissors and pull the stitch out  You may feel a slight tug as the stitch comes out  He may place small steristrips across your wound after the stitches have been removed  These pieces of tape will peel and fall of on their own  Do not pull them off  What else do I need to know about stitch removal?   · Clean your wound as directed  Carefully wash your wound with soap and water  Pat the area dry with a clean towel  · Protect your wound  Your wound can swell, bleed, or split open if it is stretched or bumped  You may need to wear a bandage that supports your wound until it is completely healed  · Minimize your scar  Use sunblock if your wound is exposed to the sun  Apply it every day after the stitches are removed  This will help prevent skin discoloration  When should I seek immediate care? · Your wound splits open  · You suddenly cannot move your injured joint  · You have sudden numbness around your wound  · You see red streaks coming from your wound  When should I contact my healthcare provider? · You have a fever and chills  · Your wound is red, warm, swollen, or leaking pus  · There is a bad smell coming from your wound  · You have increased pain in the wound area  · You have questions or concerns about your condition or care  CARE AGREEMENT:   You have the right to help plan your care  Learn about your health condition and how it may be treated  Discuss treatment options with your caregivers to decide what care you want to receive  You always have the right to refuse treatment   The above information is an  only  It is not intended as medical advice for individual conditions or treatments  Talk to your doctor, nurse or pharmacist before following any medical regimen to see if it is safe and effective for you  © 2017 [employer] Information is for End User's use only and may not be sold, redistributed or otherwise used for commercial purposes  All illustrations and images included in CareNotes® are the copyrighted property of [employer]  or [address]  Suicide Prevention for Adults   WHAT YOU NEED TO KNOW:   What do I need to know about suicide prevention? A person may see suicide as the only way to escape emotional or physical pain and suffering  You can help provide emotional support for him or her and get the help he or she needs  Learn to recognize warning signs that the person may be considering suicide  Find resources to help prevent him or her from attempting to take his or her life  What should I do if I think the person is considering suicide? Call 911 if you feel the person is at immediate risk of suicide, or if he or she talks about an active suicide plan  Assume that the person intends to carry out his or her plan  Resources are available to help you and the person  The following are some things you can do:  · Call the [address] [place] [address] at [phone]-TALK ([phone])   · Call the Suicide Hotline at 2-313-XPGHQOT (0-487.373.7303)   · Contact the person's therapist  His or her healthcare provider can give you a list of therapists if he or she does not have one  · Keep medicines, weapons, and alcohol out of the person's reach  Do not leave the person alone if he or she says they want to commit suicide  Do not leave the person alone if you think he or she may try it  Make sure you do not put yourself at risk if the person has a weapon  · Do not be afraid to ask if the person is thinking of ending his or her life  Ask if the person has a plan for hurting or killing himself or herself  What warning signs should I watch for? · Talking about a plan for committing suicide, or suddenly deciding to make a will    · Cutting himself or herself, burning the skin with cigarettes, or driving recklessly    · Drug or alcohol use, not taking prescribed medicine, or taking too much prescribed medicine    · Sudden anger, lashing out at others, or seeming hopeless, anxious, or angry and then suddenly becoming happy or peaceful    · Not wanting to spend time with others or doing things he or she usually enjoys    · Trouble at work, or not showing up for work    · A change in the way he or she eats, sleeps, or dresses    · Weight gain or loss or having less energy than usual    · Trouble sleeping or spending a lot of time sleeping    · Giving away or throwing away his belongings  What increases the risk for suicide? · Depression or chronic sleep disorders such as insomnia    · Alcohol or drug use    · Death of an important person, or the anniversary of that person's death    · A past suicide attempt, or someone close to him or her attempted or committed suicide    · Mental illness, such as schizophrenia, bipolar disorder, or posttraumatic stress disorder (PTSD)     · Chronic pain, or a serious illness, such as heart disease, cancer, or AIDS    · Being physically dependent on others    · Mental, physical, or sexual abuse    · A history of violence or aggression toward others, or feeling guilty for hurting someone else    · Stress from divorce or a breakup, or loss of a friendship, or loneliness    · Struggling with being parsons, lesbian, or bisexual    · Stress from the loss of a job, or a stressful job  How will healthcare providers help the person? · Healthcare providers will ask questions about the person's suicide thoughts and plans  They will ask how often he or she thinks about suicide and if he or she has tried it before   They will ask if he or she hurt himself, such as with cutting or reckless driving  They may ask if he or she has access to weapons or drugs  · A healthcare provider will help the person create a safety plan  The plan includes a list of people or groups to contact if he or she has suicidal thoughts again  The list may include friends, family members, a spiritual leader, and others he or she trusts  The person may be asked to make a verbal agreement or sign a contract that he or she will not try to harm himself  What treatment may the person need? · Medicines  may be given to prevent mood swings, or to decrease anxiety or depression  The person will need to take all medicines as directed  A sudden stop can be harmful  It may take 4 to 6 weeks for the medicine to help him or her feel better  · A therapist  can help the person identify and change negative feelings or beliefs about himself or herself  This may also help change the way the he or she feels and acts  A therapist can also help the person find ways to cope with things that cannot be changed  What can I do to help the person? · Encourage the person to seek help for drug or alcohol abuse  Drugs and alcohol can increase suicidal thoughts and make the person more likely to act on them  · Help the person connect with others  Encourage him or her to become involved in the community  Some examples include tutoring a young student, volunteering at a [employer], or joining a group exercise program  The person may need help setting up a computer or creating an e-mail account to help him or her remain connected to others  · Exercise with the person  Exercise can lift his or her mood, increase energy, and make it easier to sleep at night  · Encourage the person to try new things  Adults who are open to new experiences handle stress and change better than those who are not  · Call, visit, or send postcards to the person often    Check on him or her after the loss of a pet, longtime friend, or child  Holidays, birthdays, and anniversaries can be difficult for a person after a loss  The loss of a spouse can be especially painful and lonely  · Help the person schedule a visit with his or her Hoahaoism or spiritual leader  A Hoahaoism or spiritual leader may be able to offer additional support and resources to the person  · Help the person get equipment that will increase his or her comfort and mobility  Examples are hearing aids, glasses, large print books, and walkers  These can help him or her enjoy activities and feel more independent  · Encourage the person to continue taking medicine and going to therapy  Medicine and therapy can help improve his or her mental health  Where can I find support and more information? · [facility] , [address] [place] [address]  Phone: 3- 196 [phone] (01 [phone])  Web Address: [place]  · Suicide Awareness [employer]  [address] [place] [address] [address] , [address]  Phone: 0- [phone] - [phone]  Web Address: [employer]  org  Call 911 if:   · The person has done something on purpose to hurt himself or herself  · The person tries to commit suicide  When should I seek immediate care? · The person tells you he or she made a plan to commit suicide  · The person acts out in anger, is reckless, or is abusing alcohol or drugs  · The person has serious thoughts of suicide, even with treatment  When should I contact the person's healthcare provider or therapist?   · You begin to see warning signs that the person may be considering suicide  · The person has intense feelings of sadness, anger, revenge, or despair, or he cannot make decisions easily  · The person tells you he or she has more thoughts of suicide when they are alone  · The person withdraws from others      · The person stops eating, or begins to smoke or drink heavily  · The person feels he or she is a burden because of a disability or disease  · The person has trouble dealing with stress, such as a breakup or a job loss  · You have questions or concerns about the person's condition or care  CARE AGREEMENT:   You have the right to help plan your care  Learn about your health condition and how it may be treated  Discuss treatment options with your caregivers to decide what care you want to receive  You always have the right to refuse treatment  The above information is an  only  It is not intended as medical advice for individual conditions or treatments  Talk to your doctor, nurse or pharmacist before following any medical regimen to see if it is safe and effective for you  © 2017 [address] [employer] Information is for End User's use only and may not be sold, redistributed or otherwise used for commercial purposes  All illustrations and images included in CareNotes® are the copyrighted property of [employer]  or Lei One[employer]  Statement Selected